# Patient Record
Sex: FEMALE | Race: WHITE | NOT HISPANIC OR LATINO | Employment: FULL TIME | URBAN - METROPOLITAN AREA
[De-identification: names, ages, dates, MRNs, and addresses within clinical notes are randomized per-mention and may not be internally consistent; named-entity substitution may affect disease eponyms.]

---

## 2020-06-30 DIAGNOSIS — R03.0 ELEVATED BLOOD PRESSURE READING WITHOUT DIAGNOSIS OF HYPERTENSION: Primary | ICD-10-CM

## 2020-07-06 ENCOUNTER — HOSPITAL ENCOUNTER (OUTPATIENT)
Dept: CARDIOLOGY | Facility: CLINIC | Age: 30
Discharge: HOME | End: 2020-07-06
Attending: FAMILY MEDICINE | Admitting: FAMILY MEDICINE
Payer: COMMERCIAL

## 2020-07-06 DIAGNOSIS — R03.0 ELEVATED BLOOD PRESSURE READING WITHOUT DIAGNOSIS OF HYPERTENSION: ICD-10-CM

## 2020-07-06 PROCEDURE — 93790 AMBL BP MNTR W/SW I&R: CPT | Performed by: INTERNAL MEDICINE

## 2020-07-06 PROCEDURE — 93788 AMBL BP MNTR W/SW A/R: CPT

## 2020-11-07 ENCOUNTER — HEALTH MAINTENANCE LETTER (OUTPATIENT)
Age: 30
End: 2020-11-07

## 2021-09-05 ENCOUNTER — HEALTH MAINTENANCE LETTER (OUTPATIENT)
Age: 31
End: 2021-09-05

## 2021-12-26 ENCOUNTER — HEALTH MAINTENANCE LETTER (OUTPATIENT)
Age: 31
End: 2021-12-26

## 2022-06-12 ENCOUNTER — HEALTH MAINTENANCE LETTER (OUTPATIENT)
Age: 32
End: 2022-06-12

## 2022-10-11 PROCEDURE — 88305 TISSUE EXAM BY PATHOLOGIST: CPT | Mod: TC,ORL | Performed by: STUDENT IN AN ORGANIZED HEALTH CARE EDUCATION/TRAINING PROGRAM

## 2022-10-11 PROCEDURE — 88305 TISSUE EXAM BY PATHOLOGIST: CPT | Mod: 26 | Performed by: STUDENT IN AN ORGANIZED HEALTH CARE EDUCATION/TRAINING PROGRAM

## 2022-10-12 ENCOUNTER — LAB REQUISITION (OUTPATIENT)
Dept: LAB | Facility: CLINIC | Age: 32
End: 2022-10-12
Payer: COMMERCIAL

## 2022-10-13 LAB
PATH REPORT.COMMENTS IMP SPEC: NORMAL
PATH REPORT.COMMENTS IMP SPEC: NORMAL
PATH REPORT.FINAL DX SPEC: NORMAL
PATH REPORT.GROSS SPEC: NORMAL
PATH REPORT.MICROSCOPIC SPEC OTHER STN: NORMAL
PATH REPORT.RELEVANT HX SPEC: NORMAL
PHOTO IMAGE: NORMAL

## 2022-10-23 ENCOUNTER — HEALTH MAINTENANCE LETTER (OUTPATIENT)
Age: 32
End: 2022-10-23

## 2023-04-20 ENCOUNTER — LAB REQUISITION (OUTPATIENT)
Dept: LAB | Facility: CLINIC | Age: 33
End: 2023-04-20
Payer: COMMERCIAL

## 2023-04-20 PROCEDURE — 88305 TISSUE EXAM BY PATHOLOGIST: CPT | Mod: 26 | Performed by: PATHOLOGY

## 2023-04-20 PROCEDURE — 88305 TISSUE EXAM BY PATHOLOGIST: CPT | Mod: TC,ORL | Performed by: OBSTETRICS & GYNECOLOGY

## 2023-06-24 ENCOUNTER — HEALTH MAINTENANCE LETTER (OUTPATIENT)
Age: 33
End: 2023-06-24

## 2023-10-13 ENCOUNTER — ANCILLARY PROCEDURE (OUTPATIENT)
Dept: GENERAL RADIOLOGY | Facility: CLINIC | Age: 33
End: 2023-10-13
Attending: STUDENT IN AN ORGANIZED HEALTH CARE EDUCATION/TRAINING PROGRAM
Payer: COMMERCIAL

## 2023-10-13 DIAGNOSIS — Z31.41 FERTILITY TESTING: ICD-10-CM

## 2023-10-13 PROCEDURE — 58340 CATHETER FOR HYSTEROGRAPHY: CPT

## 2023-10-13 PROCEDURE — 74740 X-RAY FEMALE GENITAL TRACT: CPT

## 2023-10-20 ENCOUNTER — TRANSFERRED RECORDS (OUTPATIENT)
Dept: HEALTH INFORMATION MANAGEMENT | Facility: CLINIC | Age: 33
End: 2023-10-20

## 2024-06-14 LAB
HEPATITIS B SURFACE ANTIGEN (EXTERNAL): NEGATIVE
HIV1+2 AB SERPL QL IA: NONREACTIVE
RUBELLA ANTIBODY IGG (EXTERNAL): NORMAL
TREPONEMA PALLIDUM ANTIBODY (EXTERNAL): NONREACTIVE

## 2024-07-10 ENCOUNTER — TRANSFERRED RECORDS (OUTPATIENT)
Dept: HEALTH INFORMATION MANAGEMENT | Facility: CLINIC | Age: 34
End: 2024-07-10
Payer: COMMERCIAL

## 2024-07-10 ENCOUNTER — MEDICAL CORRESPONDENCE (OUTPATIENT)
Dept: HEALTH INFORMATION MANAGEMENT | Facility: CLINIC | Age: 34
End: 2024-07-10
Payer: COMMERCIAL

## 2024-07-22 ENCOUNTER — TRANSCRIBE ORDERS (OUTPATIENT)
Dept: MATERNAL FETAL MEDICINE | Facility: CLINIC | Age: 34
End: 2024-07-22
Payer: COMMERCIAL

## 2024-07-22 DIAGNOSIS — O26.90 PREGNANCY RELATED CONDITION, ANTEPARTUM: Primary | ICD-10-CM

## 2024-08-17 ENCOUNTER — HEALTH MAINTENANCE LETTER (OUTPATIENT)
Age: 34
End: 2024-08-17

## 2024-08-18 ENCOUNTER — APPOINTMENT (OUTPATIENT)
Dept: ULTRASOUND IMAGING | Facility: CLINIC | Age: 34
End: 2024-08-18
Attending: OBSTETRICS & GYNECOLOGY
Payer: COMMERCIAL

## 2024-08-18 ENCOUNTER — HOSPITAL ENCOUNTER (OUTPATIENT)
Facility: CLINIC | Age: 34
Discharge: HOME | End: 2024-08-18
Attending: OBSTETRICS & GYNECOLOGY | Admitting: OBSTETRICS & GYNECOLOGY
Payer: COMMERCIAL

## 2024-08-18 ENCOUNTER — HOSPITAL ENCOUNTER (OUTPATIENT)
Facility: CLINIC | Age: 34
End: 2024-08-18
Admitting: OBSTETRICS & GYNECOLOGY
Payer: COMMERCIAL

## 2024-08-18 VITALS — TEMPERATURE: 98.1 F | DIASTOLIC BLOOD PRESSURE: 80 MMHG | SYSTOLIC BLOOD PRESSURE: 127 MMHG

## 2024-08-18 LAB
ALBUMIN UR-MCNC: NEGATIVE MG/DL
APPEARANCE UR: CLEAR
BILIRUB UR QL STRIP: NEGATIVE
CLUE CELLS: ABNORMAL
COLOR UR AUTO: ABNORMAL
CRYSTALS AMN MICRO: NORMAL
GLUCOSE UR STRIP-MCNC: NEGATIVE MG/DL
HGB UR QL STRIP: NEGATIVE
KETONES UR STRIP-MCNC: 150 MG/DL
LEUKOCYTE ESTERASE UR QL STRIP: NEGATIVE
MUCOUS THREADS #/AREA URNS LPF: PRESENT /LPF
NITRATE UR QL: NEGATIVE
PH UR STRIP: 5.5 [PH] (ref 5–7)
RBC URINE: 6 /HPF
SP GR UR STRIP: 1.02 (ref 1–1.03)
SQUAMOUS EPITHELIAL: 1 /HPF
TRICHOMONAS, WET PREP: ABNORMAL
UROBILINOGEN UR STRIP-MCNC: NORMAL MG/DL
WBC URINE: 5 /HPF
WBC'S/HIGH POWER FIELD, WET PREP: ABNORMAL
YEAST, WET PREP: PRESENT

## 2024-08-18 PROCEDURE — G0463 HOSPITAL OUTPT CLINIC VISIT: HCPCS | Mod: 25

## 2024-08-18 PROCEDURE — 81001 URINALYSIS AUTO W/SCOPE: CPT | Performed by: OBSTETRICS & GYNECOLOGY

## 2024-08-18 PROCEDURE — 87210 SMEAR WET MOUNT SALINE/INK: CPT | Performed by: OBSTETRICS & GYNECOLOGY

## 2024-08-18 PROCEDURE — 76805 OB US >/= 14 WKS SNGL FETUS: CPT

## 2024-08-18 RX ORDER — ONDANSETRON 4 MG/1
4 TABLET, ORALLY DISINTEGRATING ORAL EVERY 6 HOURS PRN
Status: DISCONTINUED | OUTPATIENT
Start: 2024-08-18 | End: 2024-08-18 | Stop reason: HOSPADM

## 2024-08-18 RX ORDER — PROCHLORPERAZINE 25 MG
25 SUPPOSITORY, RECTAL RECTAL EVERY 12 HOURS PRN
Status: DISCONTINUED | OUTPATIENT
Start: 2024-08-18 | End: 2024-08-18 | Stop reason: HOSPADM

## 2024-08-18 RX ORDER — DEXTROAMPHETAMINE SACCHARATE, AMPHETAMINE ASPARTATE MONOHYDRATE, DEXTROAMPHETAMINE SULFATE AND AMPHETAMINE SULFATE 7.5; 7.5; 7.5; 7.5 MG/1; MG/1; MG/1; MG/1
30 CAPSULE, EXTENDED RELEASE ORAL DAILY
COMMUNITY
Start: 2023-10-12

## 2024-08-18 RX ORDER — ONDANSETRON 2 MG/ML
4 INJECTION INTRAMUSCULAR; INTRAVENOUS EVERY 6 HOURS PRN
Status: DISCONTINUED | OUTPATIENT
Start: 2024-08-18 | End: 2024-08-18 | Stop reason: HOSPADM

## 2024-08-18 RX ORDER — METOCLOPRAMIDE HYDROCHLORIDE 5 MG/ML
10 INJECTION INTRAMUSCULAR; INTRAVENOUS EVERY 6 HOURS PRN
Status: DISCONTINUED | OUTPATIENT
Start: 2024-08-18 | End: 2024-08-18 | Stop reason: HOSPADM

## 2024-08-18 RX ORDER — NIFEDIPINE 30 MG/1
1 TABLET, EXTENDED RELEASE ORAL
COMMUNITY
Start: 2024-07-22

## 2024-08-18 RX ORDER — LEVOTHYROXINE SODIUM 100 UG/1
1 TABLET ORAL
COMMUNITY
Start: 2024-07-22

## 2024-08-18 RX ORDER — METOCLOPRAMIDE 10 MG/1
10 TABLET ORAL EVERY 6 HOURS PRN
Status: DISCONTINUED | OUTPATIENT
Start: 2024-08-18 | End: 2024-08-18 | Stop reason: HOSPADM

## 2024-08-18 RX ORDER — PROCHLORPERAZINE MALEATE 10 MG
10 TABLET ORAL EVERY 6 HOURS PRN
Status: DISCONTINUED | OUTPATIENT
Start: 2024-08-18 | End: 2024-08-18 | Stop reason: HOSPADM

## 2024-08-18 ASSESSMENT — ACTIVITIES OF DAILY LIVING (ADL)
ADLS_ACUITY_SCORE: 18
ADLS_ACUITY_SCORE: 35

## 2024-08-18 NOTE — CARE PLAN
"Data: Patient presented to [facility]: 2024  1:54 PM.  Reason for maternal/fetal assessment is leaking vaginal fluid. Patient reports large gush of fluid that happened yesterday evening around . Feels as though there has been some continued trickling of fluid today. Patient reports \" the fluid spelled sweet, my  thought he smelled urine\". Pt unsure if fluid is normal discharge at this time so here to be evaluated. No bleeding. Has had intercourse in the last 24 hours. .  Patient is a .  Prenatal record reviewed. Pregnancy  has been complicated by hypothyroidism, Hypertension, ADHD.  Gestational Age 16w2d. VSS. Fetal movement has not yet been felt by patient at 16w2d. Patient denies uterine contractions, vaginal bleeding, abdominal pain, pelvic pressure, nausea, vomiting, headache, visual disturbances, epigastric or URQ pain, significant edema. Support person is present.   Action: Verbal consent for EFM. Triage assessment completed. Bill of rights reviewed.  Response: Patient verbalized agreement with plan. Will contact Dr Regina Schmidt with update and for further orders.   "

## 2024-08-18 NOTE — PROVIDER NOTIFICATION
08/18/24 1548   Provider Notification   Provider Name/Title Dr Yaya Schmidt   Method of Notification Electronic Page   Request Evaluate - Remote   Notification Reason Lab/Diagnostic Study  (urine resulted Ketones elevated at 150)     No change to plan of care. Encourage patient to eat something per MD.

## 2024-08-18 NOTE — CARE PLAN
Data: Patient assessed in the [facility] for leaking vaginal fluid.  Cervical exam not examined.  Membranes intact.  Contractions/uterine assessment not completed as patient is 16w2d.  Action:  Discharge instructions reviewed.  Patient instructed to report vaginal leaking of fluid or bleeding, abdominal pain, or any concerns related to the pregnancy to her nurse/physician.    Response: Orders to discharge home per Regina Schmidt.  Patient verbalized understanding of education and verbalized agreement with plan. Discharged to home at 1555.     Patient instructed regarding positive yeast infection that resulted with wet prep. Per MD, patient can take over the counter miconazole if she feels she needs to at this time, since she is not having symptoms, ok to not treat at this time. Patient can also reach out to her clinic to see if they have further recommendations or if the patient were to develops symptoms.

## 2024-08-18 NOTE — PROVIDER NOTIFICATION
"   [id number]   Provider Notification   Provider Name/Title Dr Yaya Schmidt   Method of Notification Phone   Request Evaluate - Remote     Patient is usually seen at [facility] in [place]. Dr Yaya Schmidt is Dr of the week.  Patient is , 16wk2d. Presents with large gush of fluid that happened yesterday evening around . Feels as though there has been some continued trickling of fluid today. Patient reports \" the fluid spelled sweet, my  thought he smelled urine\". Pt unsure if fluid is normal discharge at this time so here to be evaluated. No bleeding. Has had intercourse in the last 24 hours.  Per Dr Schmidt, orders received for UA, wet prep, fern and US to assess fluid level. Pending all the labs and imaging, if everything looks normal, ok to discharge patient home and have her call her clinic to be seen in the next few days. If labs abnormal or concerning, will reach out to Dr Mendez for further orders.  "

## 2024-08-18 NOTE — PROVIDER NOTIFICATION
08/18/24 1536   Provider Notification   Provider Name/Title Dr Yaya Schmidt   Method of Notification Phone     Dr Yaya Schmidt called the unit stating she has seen patients resulted labs. No ferning present, US shows normal fluid levels. Wet prep came back with yeast, per MD, can get over the counter miconazole if she would like to treat, but if not having symptoms at this time, no need to treat. Patient can also reach out to her clinic and get recommendations from them. Will discuss with patient and plan for discharge to home.

## 2024-08-18 NOTE — DISCHARGE INSTRUCTIONS
Learning About When to Call Your Doctor During Pregnancy (Up to 20 Weeks)  Overview     It's common to have concerns about what might be a problem during your pregnancy. Most pregnancies don't have any serious problems. But it's still important to know when to call your doctor if you have certain symptoms.  These are general suggestions. Your doctor may give you some more information about when to call.  When to call your doctor (up to 20 weeks)  Call 911 anytime you think you may need emergency care. For example, call if:  You have severe vaginal bleeding. This means you are soaking through a pad each hour for 2 or more hours.  You have chest pain, are short of breath, or cough up blood.  You have sudden, severe pain in your belly.  You passed out (lost consciousness).  Call your doctor now or seek immediate medical care if:  You have a fever.  You have vaginal bleeding.  You are dizzy or lightheaded, or you feel like you may faint.  You have signs of a blood clot in your leg (called a deep vein thrombosis), such as:  Pain in the calf, back of the knee, thigh, or groin.  Swelling in your leg or groin.  A color change on the leg or groin. The skin may be reddish or purplish, depending on your usual skin color.  You have symptoms of a urinary tract infection. These may include:  Pain or burning when you urinate.  A frequent need to urinate without being able to pass much urine.  Pain in the flank, which is just below the rib cage and above the waist on either side of the back.  Blood in your urine.  You have belly pain.  You think you are having contractions.  Watch closely for changes in your health, and be sure to contact your doctor if:  You have vaginal discharge that smells bad.  You feel sad, anxious, or hopeless for more than a few days.  You have other concerns about your pregnancy.  Follow-up care is a key part of your treatment and safety. Be sure to make and go to all appointments, and call your doctor  "if you are having problems. It's also a good idea to know your test results and keep a list of the medicines you take.  Where can you learn more?  Go to [email or web address]  Enter G674 in the search box to learn more about \"Learning About When to Call Your Doctor During Pregnancy (Up to 20 Weeks).\"  Current as of: July 10, 2023  Content Version: 14.1 2006-2024 Calvin.   Care instructions adapted under license by your healthcare professional. If you have questions about a medical condition or this instruction, always ask your healthcare professional. [employer], [employer] disclaims any warranty or liability for your use of this information.    "

## 2024-08-26 ENCOUNTER — PRE VISIT (OUTPATIENT)
Dept: MATERNAL FETAL MEDICINE | Facility: CLINIC | Age: 34
End: 2024-08-26
Payer: COMMERCIAL

## 2024-09-03 ENCOUNTER — OFFICE VISIT (OUTPATIENT)
Dept: MATERNAL FETAL MEDICINE | Facility: CLINIC | Age: 34
End: 2024-09-03
Attending: OBSTETRICS & GYNECOLOGY
Payer: COMMERCIAL

## 2024-09-03 ENCOUNTER — HOSPITAL ENCOUNTER (OUTPATIENT)
Dept: ULTRASOUND IMAGING | Facility: CLINIC | Age: 34
Discharge: HOME | End: 2024-09-03
Attending: OBSTETRICS & GYNECOLOGY
Payer: COMMERCIAL

## 2024-09-03 DIAGNOSIS — O09.522 MULTIGRAVIDA OF ADVANCED MATERNAL AGE IN SECOND TRIMESTER: Primary | ICD-10-CM

## 2024-09-03 DIAGNOSIS — O26.90 PREGNANCY RELATED CONDITION, ANTEPARTUM: ICD-10-CM

## 2024-09-03 DIAGNOSIS — O10.919 CHRONIC HYPERTENSION IN PREGNANCY: Primary | ICD-10-CM

## 2024-09-03 DIAGNOSIS — O09.512 PRIMIGRAVIDA OF ADVANCED MATERNAL AGE IN SECOND TRIMESTER: ICD-10-CM

## 2024-09-03 DIAGNOSIS — Z36.2 ENCOUNTER FOR FOLLOW-UP ULTRASOUND OF FETAL ANATOMY: ICD-10-CM

## 2024-09-03 PROCEDURE — 76811 OB US DETAILED SNGL FETUS: CPT | Mod: 26 | Performed by: OBSTETRICS & GYNECOLOGY

## 2024-09-03 PROCEDURE — 99204 OFFICE O/P NEW MOD 45 MIN: CPT | Mod: 25 | Performed by: OBSTETRICS & GYNECOLOGY

## 2024-09-03 PROCEDURE — 96040 HC GENETIC COUNSELING, EACH 30 MINUTES: CPT

## 2024-09-03 PROCEDURE — 76811 OB US DETAILED SNGL FETUS: CPT

## 2024-09-03 NOTE — PROGRESS NOTES
[facility] Fetal Medicine Center  Genetic Counseling Consult    Patient:  Vianey Schmidt YOB: 1990   Date of Service:  24   MRN: [id number]    Vianey was seen at the [facility] for genetic consultation. The indication for genetic counseling is advanced maternal age and recurrent pregnancy loss. The patient was accompanied to this visit by their partner, David.    IMPRESSION/ PLAN   1. Vianey had genetic screening earlier in this pregnancy. Their non-invasive prenatal test was screen negative or low risk for screened conditions     2. During today's [facility] visit, Vianey had a genetic counseling session only. Screening and diagnostic testing was discussed and declined.     3. Vianey had a level II comprehensive anatomy ultrasound today. Please see the ultrasound report for further details.    4. Further recommendation include a follow-up ultrasound with [facility]. The upcoming ultrasound has been scheduled for 2024.    PREGNANCY HISTORY   /Parity:       Vianey's pregnancy history is significant for:   SAB 3x - Vianey reported that they speculate the miscarriages were due to her thyroid levels and after being placed on thyroid medication they were able to conceive and maintain pregnancy past first trimester.     Recurrent miscarriage is defined as three or more clinically recognized consecutive or non-consecutive pregnancy losses occurring prior to fetal viability (<24 weeks). At least 10-15% of the recognized pregnancies end in miscarriage, with most losses occurring in the first trimester. The rate of miscarriage less than 8 weeks gestation may be even greater as some women may not recognize that they are pregnant. Around half of miscarriages that occur before 20 weeks gestation are caused by chromosome abnormalities. The risk for a miscarriage increases with advancing maternal age due to a higher incidence of conceptuses with a  "chromosomal aneuploidy. The risk may approach 75% in women who are 45 years of age and older. In about 50% of couples with recurrent pregnancy loss, the etiology remains unknown despite a thorough evaluation and is therefore classified as idiopathic. It is estimated that couples with idiopathic recurrent pregnacy loss can have up to a 75% chance of having a successful pregnancy. For couples who have experienced three or more unexplained pregnancy losses, it has been estimated that around 2-5% of these couples have this history related to a chromosome in one of the partners. Chromosome analysis on parents is possible by obtaining a karyotype on peripheral blood in which the chromosomes are sorted and counted in the laboratory. Vianey and David declined parental chromosomes at this time and they are aware that this option remains available to them.       CURRENT PREGNANCY   Current Age: 34 year old     Age at Delivery: 34 year old    ANSON: 2025, by Last Menstrual Period                                     Gestational Age: 18w4d    This pregnancy is a single gestation.     This pregnancy was conceived spontaneously.    Vianey reports no bleeding, complications, illnesses, fever or exposure concerns with this pregnancy.     MEDICAL HISTORY   Vianey s reported medical history is not expected to impact pregnancy management or risks to fetal development.        FAMILY HISTORY   A three-generation pedigree was obtained today and is scanned under the \"Media\" tab in Epic. The family history was reported by Vianey and their partner.    The following significant findings were reported today:   Vianey's maternal grandfather passed away at age 45 from a heart attack.   Vianey's paternal uncle passed away from a heart attack in his 50s  Vianey's paternal aunt  from bone cancer in her 50s  The father of the pregnancy, David (29yrs), is healthy   David's brother was born with a minor cleft eye but is otherwise healthy.    We discussed " that approximately 3-5% of births are affected by a birth defect and having a relative with a birth defect may increase that risk.   David's maternal grandfather was diagnosed with multiple sclerosis in his 30s. Multiple sclerosis (MS) is an autoimmune disease of the central nervous system, characterized by focal inflammation, demyelination, and axonal injury. MS is thought to be multifactorial, meaning a combination of environmental factors and variations in dozens of genes are involved in the development of MS. Given that there is a genetic component, the risk of developing MS is higher for siblings or children of a person with the condition than for the general population.  David's maternal cousin, has developmental and intellectual delays.   David's paternal grandma had ovarian cancer in her 60s.     We discussed how most cancer seen in families occurs sporadically, but about 5-10% may be due to an underlying genetic etiology. The couple was encouraged to share this family history information with their primary care providers to ensure appropriate screening. Additionally, I encouraged Vianey to share her family history of heart disease with her primary care provider.   Otherwise, the reported family history is unremarkable for multiple miscarriages, stillbirths, known genetic conditions, and consanguinity.   RISK ASSESSMENT FOR CHROMOSOME CONDITIONS   We explained that the risk for fetal chromosome abnormalities increases with maternal age. We discussed specific features of common chromosome abnormalities, including Down syndrome, trisomy 13, trisomy 18, and sex chromosome trisomies.    At age 34 at midtrimester, the risk to have a baby with Down syndrome is 1 in 216.   At age 34 at midtrimester, the risk to have a baby with any chromosome abnormality is 1 in 105.     Vianey had genetic screening earlier in this pregnancy. Their non-invasive prenatal test was screen negative or low risk for screened conditions      Non-invasive prenatal testing (NIPT) results  Maternal plasma cell-free DNA testing  Screens for fetal trisomy 21, trisomy 13, trisomy 18, and sex chromosome aneuploidy  Vianey had  NIPT earlier in pregnancy; we reviewed the results today, which are low risk.  The NIPT did include sex chromosome aneuploidies and the result was low risk. The predicted sex is XX, which is typically female.  Given the accuracy of this test, these results greatly decrease the chance for certain fetal chromosome abnormalities  We discussed the limitations of normal NIPT results    RISK ASSESSMENT FOR INHERITED CONDITIONS   We discussed that every pregnancy has a chance to have an inherited single-gene condition, even when there is no family history of that condition. In fact, approximately 90% of couples at an increased reproductive risk for an inherited condition have no family history of that condition. The average person may be a carrier for 5-10 different genetic variants that can increase the chance for their pregnancies to have that condition. We discussed autosomal recessive conditions and X-linked conditions. Autosomal recessive conditions happen when a mutation has been inherited from the egg and sperm and include conditions like cystic fibrosis, thalassemia, hearing loss, spinal muscular atrophy, and more. X-linked conditions happen when a mutation has been inherited from the egg and include conditions like fragile X syndrome.     We reviewed that when both biological parents carry a harmful genetic change in a gene associated with autosomal recessive inheritance, each of their pregnancies has a 1 in 4 (25%) chance to be affected by that condition. With x-linked conditions, the specific risk generally depends on the chromosomal sex of the fetus, with XY individuals (generally male) being most severely affected.      screening was reviewed. About [place]    The patient had previous carrier screening for  436 conditions through [employer]. A copy of the report was available for review today. The patient's partner did not complete carrier screening as well.  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     Douglas disease (ATP7B: c.2930C>T):  This condition causes copped to build up in the organs, particularly the liver, brain, and eyes. The age of onset and severity can vary, though typically presents in the teenage years or earlier.   Symptoms include liver dysfunction, jaundice, and liver failure. Copper deposits in the eye are also common and form a brownish-yellow ring around the iris called a Kayser-Fleischer ring. Other symptoms include kidney and heart disease, weakening of the bones, difficulty walking and speaking, and hemolytic anemia. Psychiatric concerns can also be present.   Douglas disease can be treated and includes chelation therapy to remove copper from the body. [place]  scre[place].   David has not had carrier screening for Douglas disease. The carrier frequency for Douglas disease is .     Primary Ciliary Dyskinesia (DNAI1: c.389-1G>C):  This condition is an example of a ciliopathy condition, which impacts the structure or function of the cilia of a cell. Cilia are involved with cell movement and signaling.   Symptoms of PCD can impact many different body systems, including breathing problems at birth, chronic ear infections, internal organs not typically arranged in the expected way (heterotaxy), or even situs inversus totalis, which is a complete mirror-image reversal of the internal organs.   Infertility is common amongst individuals with PCD due to motility differences in the sperm or structural differences in the fallopian tubes.  David has not had carrier screening for Primary Ciliary Dyskinesia The carrier frequency for PCD is 1/230.     If David was a carrier of either of these conditions the chance of having an affected  child is 1 in 4. Carrier screening for these conditions was offered to David and declined.  All of Vianey's children will have a 50% chance of being an unaffected carrier. If     GENETIC TESTING OPTIONS   Genetic testing during a pregnancy includes screening and diagnostic procedures.      Screening tests are non-invasive which means no risk to the pregnancy and includes ultrasounds and blood work. The benefits and limitations of screening were reviewed. Screening tests provide a risk assessment (chance) specific to the pregnancy for certain fetal chromosome abnormalities but cannot definitively diagnose or exclude a fetal chromosome abnormality. Follow-up genetic counseling and consideration of diagnostic testing is recommended with any abnormal screening result. Diagnostic testing during a pregnancy is more certain and can test for more conditions. However, the tests do have a risk of miscarriage that requires careful consideration. These tests can detect fetal chromosome abnormalities with greater than 99% certainty. Results can be compromised by maternal cell contamination or mosaicism and are limited by the resolution of current genetic testing technology.     There is no screening or diagnostic test that detects all forms of birth defects or intellectual disability.     We discussed the following screening options:   Carrier screening  Risk assessment for certain autosomal recessive and x-linked conditions. These conditions are generally infantile- or childhood-onset conditions.   Can be done any time during the pregnancy or prior to pregnancy.  Can screen for over 500 different genetic conditions.  Is not intended to diagnosis a condition in the carrier parent.    Even with negative results, a residual risk for screened conditions remains.      We discussed the following ultrasound options:  Comprehensive level II ultrasound (Fetal Anatomy Ultrasound)  Ultrasound done between 18-20 weeks gestation  Screens for  major birth defects and markers for aneuploidy (like trisomy 21 and trisomy 18)  Includes looking at the fetus/baby's growth, heart, organs (stomach, kidneys), placenta, and amniotic fluid    We discussed the following diagnostic options:   Amniocentesis  Invasive diagnostic procedure done after 15 weeks gestation  The procedure collects a small sample of amniotic fluid for the purpose of chromosomal testing and/or other genetic testing  Diagnostic result; more than 99% sensitivity for fetal chromosome abnormalities  Testing for AFP in the amniotic fluid can test for open neural tube defects      It was a pleasure to be involved with [facility] care. Face-to-face time of the meeting was  20  minutes.    TED DUKES MS, [facility]  Genetic Counselor  [facility]  Maternal Fetal Medicine  Office: [phone]   [facility]: [phone]   Fax: [phone]  [facility]

## 2024-09-03 NOTE — PROGRESS NOTES
The patient was seen for an ultrasound in the Maternal-Fetal Medicine Center at the [facility] today.  For a detailed report of the ultrasound examination, please see the ultrasound report which can be found under the imaging tab.    If you have questions regarding today's evaluation or if we can be of further service, please contact the Maternal-Fetal Medicine Center.    Shereen Marrero MD  , OB/GYN  Maternal-Fetal Medicine  [phone] (Pager)

## 2024-09-03 NOTE — NURSING NOTE
Patient presents to [facility] for L2 at 18w4d due to AMA, ADHD, hypothyroidism, CHTN. Denies LOF, vaginal bleeding, cramping/contractions. SBAR given to [facility] MD, see their note in Epic.

## 2024-09-10 ENCOUNTER — TRANSFERRED RECORDS (OUTPATIENT)
Dept: HEALTH INFORMATION MANAGEMENT | Facility: CLINIC | Age: 34
End: 2024-09-10
Payer: COMMERCIAL

## 2024-09-24 ENCOUNTER — HOSPITAL ENCOUNTER (OUTPATIENT)
Dept: ULTRASOUND IMAGING | Facility: CLINIC | Age: 34
Discharge: HOME | End: 2024-09-24
Attending: OBSTETRICS & GYNECOLOGY
Payer: COMMERCIAL

## 2024-09-24 ENCOUNTER — OFFICE VISIT (OUTPATIENT)
Dept: MATERNAL FETAL MEDICINE | Facility: CLINIC | Age: 34
End: 2024-09-24
Attending: OBSTETRICS & GYNECOLOGY
Payer: COMMERCIAL

## 2024-09-24 DIAGNOSIS — Z36.2 ENCOUNTER FOR FOLLOW-UP ULTRASOUND OF FETAL ANATOMY: ICD-10-CM

## 2024-09-24 DIAGNOSIS — O10.919 CHRONIC HYPERTENSION IN PREGNANCY: ICD-10-CM

## 2024-09-24 DIAGNOSIS — O09.512 PRIMIGRAVIDA OF ADVANCED MATERNAL AGE IN SECOND TRIMESTER: Primary | ICD-10-CM

## 2024-09-24 PROCEDURE — 76816 OB US FOLLOW-UP PER FETUS: CPT

## 2024-09-24 PROCEDURE — 76816 OB US FOLLOW-UP PER FETUS: CPT | Mod: 26 | Performed by: OBSTETRICS & GYNECOLOGY

## 2024-09-24 NOTE — PROGRESS NOTES
The patient was seen for an ultrasound in the Maternal-Fetal Medicine Center at the [facility] today.  For a detailed report of the ultrasound examination, please see the ultrasound report which can be found under the imaging tab.    If you have questions regarding today's evaluation or if we can be of further service, please contact the Maternal-Fetal Medicine Center.    Shereen Marrero MD  , OB/GYN  Maternal-Fetal Medicine  [phone] (Pager)

## 2024-09-24 NOTE — NURSING NOTE
Patient reports positive fetal movement, no pain, no contractions, leaking of fluid, or bleeding.   SBAR given to GONZÁLEZ ERY, see their note in Epic.

## 2024-12-06 ENCOUNTER — HOSPITAL ENCOUNTER (OUTPATIENT)
Facility: CLINIC | Age: 34
Discharge: HOME | End: 2024-12-06
Attending: STUDENT IN AN ORGANIZED HEALTH CARE EDUCATION/TRAINING PROGRAM | Admitting: STUDENT IN AN ORGANIZED HEALTH CARE EDUCATION/TRAINING PROGRAM
Payer: COMMERCIAL

## 2024-12-06 VITALS
HEIGHT: 60 IN | DIASTOLIC BLOOD PRESSURE: 79 MMHG | BODY MASS INDEX: 39.46 KG/M2 | RESPIRATION RATE: 14 BRPM | SYSTOLIC BLOOD PRESSURE: 124 MMHG | WEIGHT: 201 LBS | TEMPERATURE: 98.1 F

## 2024-12-06 LAB
ALBUMIN MFR UR ELPH: 8.5 MG/DL
ALBUMIN SERPL BCG-MCNC: 3.5 G/DL (ref 3.5–5.2)
ALP SERPL-CCNC: 133 U/L (ref 40–150)
ALT SERPL W P-5'-P-CCNC: 14 U/L (ref 0–50)
ANION GAP SERPL CALCULATED.3IONS-SCNC: 12 MMOL/L (ref 7–15)
AST SERPL W P-5'-P-CCNC: 23 U/L (ref 0–45)
BILIRUB SERPL-MCNC: 0.2 MG/DL
BUN SERPL-MCNC: 7.3 MG/DL (ref 6–20)
CALCIUM SERPL-MCNC: 8.8 MG/DL (ref 8.8–10.4)
CHLORIDE SERPL-SCNC: 103 MMOL/L (ref 98–107)
CREAT SERPL-MCNC: 0.35 MG/DL (ref 0.51–0.95)
CREAT UR-MCNC: 47.1 MG/DL
EGFRCR SERPLBLD CKD-EPI 2021: >90 ML/MIN/1.73M2
ERYTHROCYTE [DISTWIDTH] IN BLOOD BY AUTOMATED COUNT: 13.8 % (ref 10–15)
GLUCOSE SERPL-MCNC: 86 MG/DL (ref 70–99)
HCO3 SERPL-SCNC: 21 MMOL/L (ref 22–29)
HCT VFR BLD AUTO: 34.8 % (ref 35–47)
HGB BLD-MCNC: 11.6 G/DL (ref 11.7–15.7)
MCH RBC QN AUTO: 29 PG (ref 26.5–33)
MCHC RBC AUTO-ENTMCNC: 33.3 G/DL (ref 31.5–36.5)
MCV RBC AUTO: 87 FL (ref 78–100)
PLATELET # BLD AUTO: 283 10E3/UL (ref 150–450)
POTASSIUM SERPL-SCNC: 3.4 MMOL/L (ref 3.4–5.3)
PROT SERPL-MCNC: 6.2 G/DL (ref 6.4–8.3)
PROT/CREAT 24H UR: 0.18 MG/MG CR (ref 0–0.2)
RBC # BLD AUTO: 4 10E6/UL (ref 3.8–5.2)
SODIUM SERPL-SCNC: 136 MMOL/L (ref 135–145)
WBC # BLD AUTO: 12.8 10E3/UL (ref 4–11)

## 2024-12-06 PROCEDURE — 82247 BILIRUBIN TOTAL: CPT | Performed by: STUDENT IN AN ORGANIZED HEALTH CARE EDUCATION/TRAINING PROGRAM

## 2024-12-06 PROCEDURE — 82040 ASSAY OF SERUM ALBUMIN: CPT | Performed by: STUDENT IN AN ORGANIZED HEALTH CARE EDUCATION/TRAINING PROGRAM

## 2024-12-06 PROCEDURE — 84156 ASSAY OF PROTEIN URINE: CPT | Performed by: STUDENT IN AN ORGANIZED HEALTH CARE EDUCATION/TRAINING PROGRAM

## 2024-12-06 PROCEDURE — G0463 HOSPITAL OUTPT CLINIC VISIT: HCPCS | Mod: 25

## 2024-12-06 PROCEDURE — 84155 ASSAY OF PROTEIN SERUM: CPT | Performed by: STUDENT IN AN ORGANIZED HEALTH CARE EDUCATION/TRAINING PROGRAM

## 2024-12-06 PROCEDURE — 59025 FETAL NON-STRESS TEST: CPT

## 2024-12-06 PROCEDURE — 85027 COMPLETE CBC AUTOMATED: CPT | Performed by: STUDENT IN AN ORGANIZED HEALTH CARE EDUCATION/TRAINING PROGRAM

## 2024-12-06 PROCEDURE — 36415 COLL VENOUS BLD VENIPUNCTURE: CPT | Performed by: STUDENT IN AN ORGANIZED HEALTH CARE EDUCATION/TRAINING PROGRAM

## 2024-12-06 RX ORDER — VITAMIN A, VITAMIN C, VITAMIN D-3, VITAMIN E, VITAMIN B-1, VITAMIN B-2, NIACIN, VITAMIN B-6, CALCIUM, IRON, ZINC, COPPER 4000; 120; 400; 22; 1.84; 3; 20; 10; 1; 12; 200; 27; 25; 2 [IU]/1; MG/1; [IU]/1; MG/1; MG/1; MG/1; MG/1; MG/1; MG/1; UG/1; MG/1; MG/1; MG/1; MG/1
TABLET ORAL DAILY
COMMUNITY

## 2024-12-06 RX ORDER — ASPIRIN 81 MG/1
81 TABLET, CHEWABLE ORAL DAILY
COMMUNITY

## 2024-12-06 ASSESSMENT — ACTIVITIES OF DAILY LIVING (ADL)
ADLS_ACUITY_SCORE: 18
ADLS_ACUITY_SCORE: 18

## 2024-12-06 NOTE — PROGRESS NOTES
Pt arrived from clinic with mother for further fetal assessment, extended monitoring, and pre eclampsia lab assessment. With consent, fetal monitors placed on maternal abdomen. Initial BP slightly elevated, see flowsheet, subsequent readings WNL. Pt with trace generalized and BLE edema, denies other s/sx of preeclampsia. FHTs category 1. No uterine contractions noted on toco or noted by pt. Dr Tellez updated with lab results, fetal monitoring. Pt denies loss of fluid or vaginal bleeding. Plan to discharge pt to home and follow up as scheduled in clinic. Pt verbalizes understanding of plan of care, no further questions or concerns. Discharge to home.

## 2024-12-06 NOTE — DISCHARGE INSTRUCTIONS
Learning About When to Call Your Doctor During Pregnancy (After 20 Weeks)  Overview  It's common to have concerns about what might be a problem when you're pregnant. Most pregnancies don't have any serious problems. But it's still important to know when to call your doctor if you have certain symptoms or signs of labor.  These are general suggestions. Your doctor may give you some more information about when to call.  When to call your doctor (after 20 weeks)  Call 911  anytime you think you may need emergency care. For example, call if:  You have severe vaginal bleeding. This means you are soaking through a pad each hour for 2 or more hours.  You have sudden, severe pain in your belly.  You have chest pain, are short of breath, or cough up blood.  You passed out (lost consciousness).  You have a seizure.  You see or feel the umbilical cord.  You think you are about to deliver your baby and can't make it safely to the hospital or birthing center.  Call your doctor now or seek immediate medical care if:  You have vaginal bleeding.  You have belly pain.  You have a fever.  You are dizzy or lightheaded, or you feel like you may faint.  You have signs of a blood clot in your leg (called a deep vein thrombosis), such as:  Pain in the calf, back of the knee, thigh, or groin.  Swelling in your leg or groin.  A color change on the leg or groin. The skin may be reddish or purplish, depending on your usual skin color.  You have symptoms of preeclampsia, such as:  Sudden swelling of your face, hands, or feet.  New vision problems (such as dimness, blurring, or seeing spots).  A severe headache.  You have a sudden release of fluid from your vagina. (You think your water broke.)  You've been having regular contractions for an hour. This means that you've had at least 6 contractions within 1 hour, even after you change your position and drink fluids.  You notice that your baby has stopped moving or is moving less than  "normal.  You have signs of heart failure, such as:  New or increased shortness of breath.  New or worse swelling in your legs, ankles, or feet.  Sudden weight gain, such as more than 2 to 3 pounds in a day or 5 pounds in a week.  Feeling so tired or weak that you cannot do your usual activities.  You have symptoms of a urinary tract infection. These may include:  Pain or burning when you urinate.  A frequent need to urinate without being able to pass much urine.  Pain in the flank, which is just below the rib cage and above the waist on either side of the back.  Blood in your urine.  Watch closely for changes in your health, and be sure to contact your doctor if:  You have vaginal discharge that smells bad.  You feel sad, anxious, or hopeless for more than a few days.  You have skin changes, such as a rash, itching, or a yellow color to your skin.  You have other concerns about your pregnancy.  If you have labor signs at 37 weeks or more  If you have signs of labor at 37 weeks or more, your doctor may tell you to call when your labor becomes more active. Symptoms of active labor include:  Contractions that are regular.  Contractions that are less than 5 minutes apart.  Contractions that are hard to talk through.  Follow-up care is a key part of your treatment and safety. Be sure to make and go to all appointments, and call your doctor if you are having problems. It's also a good idea to know your test results and keep a list of the medicines you take.  Where can you learn more?  Go to https://www.[employer].net/patiented  Enter N531 in the search box to learn more about \"Learning About When to Call Your Doctor During Pregnancy (After 20 Weeks).\"  Current as of: July 10, 2023  Content Version: 14.2 2024 [facility].   Care instructions adapted under license by your healthcare professional. If you have questions about a medical condition or this instruction, always ask your healthcare professional. " [employer] disclaims any warranty or liability for your use of this information.

## 2024-12-10 ENCOUNTER — HOSPITAL ENCOUNTER (OUTPATIENT)
Facility: CLINIC | Age: 34
Setting detail: OBSERVATION
Discharge: HOME | End: 2024-12-12
Attending: STUDENT IN AN ORGANIZED HEALTH CARE EDUCATION/TRAINING PROGRAM | Admitting: STUDENT IN AN ORGANIZED HEALTH CARE EDUCATION/TRAINING PROGRAM
Payer: COMMERCIAL

## 2024-12-10 DIAGNOSIS — I10 CHRONIC HYPERTENSION: Primary | ICD-10-CM

## 2024-12-10 PROBLEM — Z36.89 ENCOUNTER FOR TRIAGE IN PREGNANT PATIENT: Status: ACTIVE | Noted: 2024-12-10

## 2024-12-10 PROBLEM — O09.90 SUPERVISION OF HIGH-RISK PREGNANCY: Status: ACTIVE | Noted: 2024-12-10

## 2024-12-10 LAB
ALBUMIN MFR UR ELPH: 25 MG/DL
ALBUMIN SERPL BCG-MCNC: 3.4 G/DL (ref 3.5–5.2)
ALP SERPL-CCNC: 126 U/L (ref 40–150)
ALT SERPL W P-5'-P-CCNC: 13 U/L (ref 0–50)
ANION GAP SERPL CALCULATED.3IONS-SCNC: 12 MMOL/L (ref 7–15)
AST SERPL W P-5'-P-CCNC: 15 U/L (ref 0–45)
BILIRUB SERPL-MCNC: 0.2 MG/DL
BUN SERPL-MCNC: 10.9 MG/DL (ref 6–20)
CALCIUM SERPL-MCNC: 8.8 MG/DL (ref 8.8–10.4)
CHLORIDE SERPL-SCNC: 100 MMOL/L (ref 98–107)
CREAT SERPL-MCNC: 0.41 MG/DL (ref 0.51–0.95)
CREAT UR-MCNC: 149.5 MG/DL
EGFRCR SERPLBLD CKD-EPI 2021: >90 ML/MIN/1.73M2
ERYTHROCYTE [DISTWIDTH] IN BLOOD BY AUTOMATED COUNT: 13.9 % (ref 10–15)
GLUCOSE SERPL-MCNC: 93 MG/DL (ref 70–99)
HCO3 SERPL-SCNC: 23 MMOL/L (ref 22–29)
HCT VFR BLD AUTO: 33.3 % (ref 35–47)
HGB BLD-MCNC: 11.1 G/DL (ref 11.7–15.7)
MCH RBC QN AUTO: 28.7 PG (ref 26.5–33)
MCHC RBC AUTO-ENTMCNC: 33.3 G/DL (ref 31.5–36.5)
MCV RBC AUTO: 86 FL (ref 78–100)
PLATELET # BLD AUTO: 271 10E3/UL (ref 150–450)
POTASSIUM SERPL-SCNC: 3 MMOL/L (ref 3.4–5.3)
PROT SERPL-MCNC: 5.9 G/DL (ref 6.4–8.3)
PROT/CREAT 24H UR: 0.17 MG/MG CR (ref 0–0.2)
RBC # BLD AUTO: 3.87 10E6/UL (ref 3.8–5.2)
SODIUM SERPL-SCNC: 135 MMOL/L (ref 135–145)
WBC # BLD AUTO: 12.6 10E3/UL (ref 4–11)

## 2024-12-10 PROCEDURE — 36415 COLL VENOUS BLD VENIPUNCTURE: CPT | Performed by: STUDENT IN AN ORGANIZED HEALTH CARE EDUCATION/TRAINING PROGRAM

## 2024-12-10 PROCEDURE — 250N000011 HC RX IP 250 OP 636: Performed by: STUDENT IN AN ORGANIZED HEALTH CARE EDUCATION/TRAINING PROGRAM

## 2024-12-10 PROCEDURE — 96372 THER/PROPH/DIAG INJ SC/IM: CPT | Performed by: STUDENT IN AN ORGANIZED HEALTH CARE EDUCATION/TRAINING PROGRAM

## 2024-12-10 PROCEDURE — 80051 ELECTROLYTE PANEL: CPT | Performed by: STUDENT IN AN ORGANIZED HEALTH CARE EDUCATION/TRAINING PROGRAM

## 2024-12-10 PROCEDURE — 84156 ASSAY OF PROTEIN URINE: CPT | Performed by: STUDENT IN AN ORGANIZED HEALTH CARE EDUCATION/TRAINING PROGRAM

## 2024-12-10 PROCEDURE — G0463 HOSPITAL OUTPT CLINIC VISIT: HCPCS

## 2024-12-10 PROCEDURE — 250N000013 HC RX MED GY IP 250 OP 250 PS 637: Performed by: OBSTETRICS & GYNECOLOGY

## 2024-12-10 PROCEDURE — 87653 STREP B DNA AMP PROBE: CPT | Performed by: STUDENT IN AN ORGANIZED HEALTH CARE EDUCATION/TRAINING PROGRAM

## 2024-12-10 PROCEDURE — 85027 COMPLETE CBC AUTOMATED: CPT | Performed by: STUDENT IN AN ORGANIZED HEALTH CARE EDUCATION/TRAINING PROGRAM

## 2024-12-10 PROCEDURE — G0378 HOSPITAL OBSERVATION PER HR: HCPCS

## 2024-12-10 RX ORDER — LIDOCAINE 40 MG/G
CREAM TOPICAL
Status: DISCONTINUED | OUTPATIENT
Start: 2024-12-10 | End: 2024-12-12 | Stop reason: HOSPADM

## 2024-12-10 RX ORDER — BETAMETHASONE SODIUM PHOSPHATE AND BETAMETHASONE ACETATE 3; 3 MG/ML; MG/ML
12 INJECTION, SUSPENSION INTRA-ARTICULAR; INTRALESIONAL; INTRAMUSCULAR; SOFT TISSUE DAILY
Status: COMPLETED | OUTPATIENT
Start: 2024-12-10 | End: 2024-12-11

## 2024-12-10 RX ORDER — CALCIUM POLYCARBOPHIL 625 MG
TABLET ORAL DAILY
COMMUNITY

## 2024-12-10 RX ORDER — PRENATAL VIT/IRON FUM/FOLIC AC 27MG-0.8MG
1 TABLET ORAL DAILY
Status: DISCONTINUED | OUTPATIENT
Start: 2024-12-11 | End: 2024-12-12 | Stop reason: HOSPADM

## 2024-12-10 RX ORDER — HYDRALAZINE HYDROCHLORIDE 20 MG/ML
10 INJECTION INTRAMUSCULAR; INTRAVENOUS
Status: DISCONTINUED | OUTPATIENT
Start: 2024-12-10 | End: 2024-12-12 | Stop reason: HOSPADM

## 2024-12-10 RX ORDER — LEVOTHYROXINE SODIUM 50 UG/1
100 TABLET ORAL
Status: DISCONTINUED | OUTPATIENT
Start: 2024-12-11 | End: 2024-12-12 | Stop reason: HOSPADM

## 2024-12-10 RX ORDER — PANTOPRAZOLE SODIUM 40 MG/1
40 TABLET, DELAYED RELEASE ORAL DAILY
Status: DISCONTINUED | OUTPATIENT
Start: 2024-12-11 | End: 2024-12-12 | Stop reason: HOSPADM

## 2024-12-10 RX ORDER — NIFEDIPINE 30 MG/1
60 TABLET, EXTENDED RELEASE ORAL
Status: DISCONTINUED | OUTPATIENT
Start: 2024-12-10 | End: 2024-12-12 | Stop reason: HOSPADM

## 2024-12-10 RX ORDER — ASPIRIN 81 MG/1
81 TABLET, CHEWABLE ORAL DAILY
Status: DISCONTINUED | OUTPATIENT
Start: 2024-12-11 | End: 2024-12-12 | Stop reason: HOSPADM

## 2024-12-10 RX ORDER — LABETALOL HYDROCHLORIDE 5 MG/ML
20-80 INJECTION, SOLUTION INTRAVENOUS EVERY 10 MIN PRN
Status: DISCONTINUED | OUTPATIENT
Start: 2024-12-10 | End: 2024-12-12 | Stop reason: HOSPADM

## 2024-12-10 RX ADMIN — NIFEDIPINE 60 MG: 30 TABLET, FILM COATED, EXTENDED RELEASE ORAL at 19:33

## 2024-12-10 RX ADMIN — BETAMETHASONE ACETATE AND BETAMETHASONE SODIUM PHOSPHATE 12 MG: 3; 3 INJECTION, SUSPENSION INTRA-ARTICULAR; INTRALESIONAL; INTRAMUSCULAR; SOFT TISSUE at 18:24

## 2024-12-10 RX ADMIN — DOXYLAMINE SUCCINATE 25 MG: 25 TABLET ORAL at 19:33

## 2024-12-10 ASSESSMENT — ACTIVITIES OF DAILY LIVING (ADL)
ADLS_ACUITY_SCORE: 41
ADLS_ACUITY_SCORE: 18

## 2024-12-10 NOTE — H&P
OB HISTORY AND PHYSICAL    Patient Name: Vianey Schmidt   Admit Date: 1210  MR #: [id number]   Acct #: [id number]   : 1990     Chief Complaint/Reason for Visit: Elevated blood pressures    History of Present Illness: Vianey Schmidt is a 34 year old  at 32w4d here for inpatient observation for blood pressure monitoring and optimization, rule out preeclampsia. Vianey has a known hx of CHTN, Bps have been stable on Procardia XL 30mg daily prior to conceiving and throughout pregnancy until 1-2 weeks ago. Last week her home Bps increased to 140-150s, increased procardia to 60mg daily. Pt called on Saturday with persistently elevated Bps in 150s, Procardia increased to 60mg qAM and 30mg qPM. Today presented to clinic with persistently elevated blood pressures 140-160s/90s. Increased swelling of both feet, ankles, and lower legs. Had intermittent headache last week that is now resolved. Occ feels lightheaded when BP is elevated. Denies vision changes, chest pain, shortness of breath or RUQ pain.  Pt notes her baby has never been super active but no major changes from her baseline. Denies ctx, vb, lof    Review of Systems:  General: denies fevers  CV: denies CP  Pulm: denies SOB  Neuro: denies HA  Abd: denies N/V  Gyn: denies vaginal discharge  Skin: denies rashes  MSK: denies calf pain  Psych: denies depression     History:   OB History    Para Term  AB Living   4 0 0 0 3 0   SAB IAB Ectopic Multiple Live Births   3 0 0 0 0      # Outcome Date GA Lbr Guilherme/2nd Weight Sex Type Anes PTL Lv   4 Current            3 SAB            2 SAB            1 SAB                  Past Medical History:   Diagnosis Date    ADHD (attention deficit hyperactivity disorder), combined type     Hypertension     Thyroid disease        Past Surgical History:   Procedure Laterality Date    BIOPSY/REMOVAL, LYMPH NODE(S)      11 yo, neck    DILATION AND CURETTAGE      x2       History reviewed. No pertinent family  history.    Social History     Socioeconomic History    Marital status:      Spouse name: Not on file    Number of children: Not on file    Years of education: Not on file    Highest education level: Not on file   Occupational History    Not on file   Tobacco Use    Smoking status: Never     Passive exposure: Never    Smokeless tobacco: Never   Substance and Sexual Activity    Alcohol use: Not Currently    Drug use: Never    Sexual activity: Yes     Partners: Male   Other Topics Concern    Not on file   Social History Narrative    Not on file     Social Drivers of Health     Financial Resource Strain: Not on file   Food Insecurity: Not on file   Transportation Needs: Not on file   Physical Activity: Not on file   Stress: Not on file   Social Connections: Not on file   Interpersonal Safety: Not on file   Housing Stability: Not on file       Allergy Information:        I have reviewed the patient's allergies.      ALLERGY@    Home Medications:   No current outpatient medications on file.       Physical Examination:  Vitals:  Temp:  [97.5  F (36.4  C)] 97.5  F (36.4  C)  BP: (119-139)/(73-89) 132/88    Exam:  General: no acute distress  Head: normocephalic, atraumatic  Eyes: EOMI  CV: pulses intact  Pulm: no increased effort  Neuro: CN II-XII grossly intact  Abd: gravid, soft, NTTP  Gyn: deferred  Skin: no rashes  MSK: no calf tenderness  Psych: AOx3, appropriate mood/affect    Fetus: FHT: 135, moderate variability, positive accels, no decels; [place]: quiet.    Laboratory and Additional Data Reviewed:  Any associated labs, path, imaging personally reviewed  No results found for any visits on 12/10/24.      Assessment and Plan: Vianey Schmidt is a 34 year old  at 32w4d here for inpatient observation for CHTN, rule out superimposed preeclampsia    Chronic Hypertension, rule out superimposed preeclampsia  - home Bps and BP in clinic today in 160s/90s  - observation on antepartum unit for close BP monitoring,  uptitration of medications, and rule out preeclampsia. In clinic plan was to add labetalol to BP regimen, however blood pressures have been normal range since presentation to hospital  - PreE labs wnl, P:C 0.17  - GBS collected  - betamethasone course started for FLM due to increased risk of  delivery due to poorly controlled hypertension  - will consider MFM consultation tomorrow, pending blood pressures over night  - If severe range BP or persistently elevated will add PO labetalol to current regimen of procardia XL 60mg BID  - of note, most recent US in clinic showed baby in breech position on Friday    Dispo: inpatient observation for BP monitoring and r/o preeclampsia    Dr. Guardado is on call for [facility] tonight, please page Dr. Guardado with any concerns until 0800    Myesha Tellez MD  [facility], [facility]  12/10/2024, 5:28 PM

## 2024-12-11 LAB
ABO + RH BLD: NORMAL
ALBUMIN SERPL BCG-MCNC: 3.2 G/DL (ref 3.5–5.2)
ALP SERPL-CCNC: 114 U/L (ref 40–150)
ALT SERPL W P-5'-P-CCNC: 11 U/L (ref 0–50)
ANION GAP SERPL CALCULATED.3IONS-SCNC: 12 MMOL/L (ref 7–15)
AST SERPL W P-5'-P-CCNC: 13 U/L (ref 0–45)
BILIRUB SERPL-MCNC: 0.2 MG/DL
BLD GP AB SCN SERPL QL: NEGATIVE
BUN SERPL-MCNC: 10.6 MG/DL (ref 6–20)
CALCIUM SERPL-MCNC: 8.3 MG/DL (ref 8.8–10.4)
CHLORIDE SERPL-SCNC: 102 MMOL/L (ref 98–107)
CREAT SERPL-MCNC: 0.51 MG/DL (ref 0.51–0.95)
EGFRCR SERPLBLD CKD-EPI 2021: >90 ML/MIN/1.73M2
ERYTHROCYTE [DISTWIDTH] IN BLOOD BY AUTOMATED COUNT: 14 % (ref 10–15)
GLUCOSE SERPL-MCNC: 167 MG/DL (ref 70–99)
GP B STREP DNA SPEC QL NAA+PROBE: NEGATIVE
HCO3 SERPL-SCNC: 21 MMOL/L (ref 22–29)
HCT VFR BLD AUTO: 29.7 % (ref 35–47)
HGB BLD-MCNC: 10.2 G/DL (ref 11.7–15.7)
MCH RBC QN AUTO: 29.6 PG (ref 26.5–33)
MCHC RBC AUTO-ENTMCNC: 34.3 G/DL (ref 31.5–36.5)
MCV RBC AUTO: 86 FL (ref 78–100)
PLATELET # BLD AUTO: 277 10E3/UL (ref 150–450)
POTASSIUM SERPL-SCNC: 3.5 MMOL/L (ref 3.4–5.3)
PROT SERPL-MCNC: 5.4 G/DL (ref 6.4–8.3)
RBC # BLD AUTO: 3.45 10E6/UL (ref 3.8–5.2)
SODIUM SERPL-SCNC: 135 MMOL/L (ref 135–145)
SPECIMEN EXP DATE BLD: NORMAL
WBC # BLD AUTO: 12 10E3/UL (ref 4–11)

## 2024-12-11 PROCEDURE — 250N000013 HC RX MED GY IP 250 OP 250 PS 637: Performed by: OBSTETRICS & GYNECOLOGY

## 2024-12-11 PROCEDURE — G0378 HOSPITAL OBSERVATION PER HR: HCPCS

## 2024-12-11 PROCEDURE — 85018 HEMOGLOBIN: CPT | Performed by: OBSTETRICS & GYNECOLOGY

## 2024-12-11 PROCEDURE — 250N000011 HC RX IP 250 OP 636: Performed by: STUDENT IN AN ORGANIZED HEALTH CARE EDUCATION/TRAINING PROGRAM

## 2024-12-11 PROCEDURE — 85041 AUTOMATED RBC COUNT: CPT | Performed by: OBSTETRICS & GYNECOLOGY

## 2024-12-11 PROCEDURE — 86900 BLOOD TYPING SEROLOGIC ABO: CPT | Performed by: OBSTETRICS & GYNECOLOGY

## 2024-12-11 PROCEDURE — 96372 THER/PROPH/DIAG INJ SC/IM: CPT | Performed by: STUDENT IN AN ORGANIZED HEALTH CARE EDUCATION/TRAINING PROGRAM

## 2024-12-11 PROCEDURE — 80053 COMPREHEN METABOLIC PANEL: CPT | Performed by: OBSTETRICS & GYNECOLOGY

## 2024-12-11 PROCEDURE — 36415 COLL VENOUS BLD VENIPUNCTURE: CPT | Performed by: OBSTETRICS & GYNECOLOGY

## 2024-12-11 RX ORDER — POTASSIUM CHLORIDE 1500 MG/1
20 TABLET, EXTENDED RELEASE ORAL ONCE
Status: COMPLETED | OUTPATIENT
Start: 2024-12-11 | End: 2024-12-11

## 2024-12-11 RX ORDER — POTASSIUM CHLORIDE 1500 MG/1
40 TABLET, EXTENDED RELEASE ORAL ONCE
Status: COMPLETED | OUTPATIENT
Start: 2024-12-11 | End: 2024-12-11

## 2024-12-11 RX ORDER — DOCUSATE SODIUM 100 MG/1
100 CAPSULE, LIQUID FILLED ORAL 2 TIMES DAILY
Status: DISCONTINUED | OUTPATIENT
Start: 2024-12-11 | End: 2024-12-12 | Stop reason: HOSPADM

## 2024-12-11 RX ADMIN — DOXYLAMINE SUCCINATE 25 MG: 25 TABLET ORAL at 20:14

## 2024-12-11 RX ADMIN — DOCUSATE SODIUM 100 MG: 100 CAPSULE, LIQUID FILLED ORAL at 20:14

## 2024-12-11 RX ADMIN — POTASSIUM CHLORIDE 20 MEQ: 1500 TABLET, EXTENDED RELEASE ORAL at 16:09

## 2024-12-11 RX ADMIN — BETAMETHASONE ACETATE AND BETAMETHASONE SODIUM PHOSPHATE 12 MG: 3; 3 INJECTION, SUSPENSION INTRA-ARTICULAR; INTRALESIONAL; INTRAMUSCULAR; SOFT TISSUE at 17:44

## 2024-12-11 RX ADMIN — POTASSIUM CHLORIDE 40 MEQ: 1500 TABLET, EXTENDED RELEASE ORAL at 14:02

## 2024-12-11 RX ADMIN — ASPIRIN 81 MG CHEWABLE TABLET 81 MG: 81 TABLET CHEWABLE at 08:35

## 2024-12-11 RX ADMIN — PRENATAL VITAMINS-IRON FUMARATE 27 MG IRON-FOLIC ACID 0.8 MG TABLET 1 TABLET: at 08:35

## 2024-12-11 RX ADMIN — DEXTROAMPHETAMINE SACCHARATE, AMPHETAMINE ASPARTATE MONOHYDRATE, DEXTROAMPHETAMINE SULFATE, AND AMPHETAMINE SULFATE 30 MG: 5; 5; 5; 5 CAPSULE ORAL at 08:34

## 2024-12-11 RX ADMIN — PANTOPRAZOLE SODIUM 40 MG: 40 TABLET, DELAYED RELEASE ORAL at 08:35

## 2024-12-11 RX ADMIN — NIFEDIPINE 60 MG: 30 TABLET, FILM COATED, EXTENDED RELEASE ORAL at 16:06

## 2024-12-11 RX ADMIN — NIFEDIPINE 60 MG: 30 TABLET, FILM COATED, EXTENDED RELEASE ORAL at 08:35

## 2024-12-11 ASSESSMENT — ACTIVITIES OF DAILY LIVING (ADL)
ADLS_ACUITY_SCORE: 18

## 2024-12-11 NOTE — PROVIDER NOTIFICATION
Vianey presents from clinic for observation for elevated Bps. Pt has chronic hypertension and takes procardia at home.     12/10/24 4385   Provider Notification   Provider Name/Title Dr Tellez   Method of Notification Phone     Serial Bps read to MD. Orders received to include overnight obs, betamethasone, GBS swab, PIV insertion, continuous monitoring overnight. Will plan for MFM consult tomorrow.

## 2024-12-11 NOTE — PLAN OF CARE
1220- Kevin REY notified of low potassium, plan to replace PO. HR has consistently been 105-115, which patient reports is a recent change in the last few days. Patient reports HR is typically in the 90's.  1230- Kevin REY at bedside discussing plan of care with patient. Plan to stay overnight, repeat Pre-e labs this evening, replace potassium with RN driven protocol.

## 2024-12-11 NOTE — PLAN OF CARE
1915  Report received from Wandy STEVENS RN.  Assuming care at this time.      1930  Patient stating it is her bedtime and requested Unisom and nifedipine.  Plan of care discussed with patient and spouse including continuous monitoring and need to adjust monitors during night.  Patient reports she has an anterior placenta.      0445  RN to patient bedside to check vital signs.  Patient sitting semi-fowlers in bed wide awake.  She states she is an early riser.  BP noted.      0700  External ultrasound adjusted multiple times during shift in attempt to maintain continuous fetal tracing.  Anterior placenta has made maintaining continuous tracing difficult.      0720  Report to Faith MASTERSON RN.  Care transferred at this time.

## 2024-12-11 NOTE — PLAN OF CARE
"Patient denies ctx, cramping, vaginal bleeding, leaking of fluid, endorses +fetal movement. Denies headache, vision changes, ringing in ears and epigastric pain at this time, does endorse a light headache when she woke up this AM that resolved on its own.   On Saturday 12/7 she did notice RUQ pain and blurry vision but resolved on its own.   Patient reports decrease in edema in feet and ankles but notes that ankles feel \"full\", RN discussed wearing SCD's while in bed and placed on patient. When asked about urine output, patient reports urine has been \"darker\" over the last few days, RN placed hat in toilet and educated patient on recording oral intake.   When RN assumed patient care this AM, patient had already taken her own levothyroxine from home, RN educated on importance of not taking medications from home that have not been verified by pharmacy, patient in agreement that she will no longer take any medications from home, and instead request them from RN.   At 0820, Kevin REY in department and gave order to remove continuous fetal monitoring, planning to round on patient later this afternoon.   Patient due to receive 2nd betamethasone at 1730 this evening. All questions and concerns addressed at this time.  "

## 2024-12-11 NOTE — PROGRESS NOTES
Vianey Schmidt   22  HD #2  Antepartum progress notes:    S: pt states that she is doing well. Feeling great, swelling improved, denies headaches, change in vision, ruq pain. She does notice that her heart rate has felt a bit elevated over the last week. Denies SOB.    O: BP (!) 141/79 (BP Location: Right arm, Patient Position: Semi-Sneed's, Cuff Size: Adult Regular)   Pulse 103   Temp 98.2  F (36.8  C) (Temporal)   Resp 20   Ht 1.524 m (5')   Wt 90.8 kg (200 lb 4 oz)   LMP 2024   BMI 39.11 kg/m     Gen; alert, NAD  Abd: soft, non tender  Ext: mild edema, non pitting, no erythema    FHT: 140 mod vianca, reactive  [place]: none    Labs:     12/10/24 193  Protein  random urine  Collected: 12/10/24 1908  Final result  Specimen: Urine, Clean Catch    Total Protein Urine mg/dL 25.0 mg/dL  Creatinine Urine mg/dL 149.5 mg/dL    Total Protein Urine mg/mg Creat 0.17 mg/mg Cr            12/10/24 1830  Comprehensive metabolic panel  Collected: 12/10/24 1750  Final result  Specimen: Blood from Arm, Right    Sodium 135 mmol/L Chloride 100 mmol/L   Potassium 3.0 Low  mmol/L Glucose 93 mg/dL   Carbon Dioxide (CO2) 23 mmol/L Alkaline Phosphatase 126 U/L   Anion Gap 12 mmol/L AST 15 U/L   Urea Nitrogen 10.9 mg/dL ALT 13 U/L   Creatinine 0.41 Low  mg/dL Protein Total 5.9 Low  g/dL   GFR Estimate >90 mL/min/1.73m2  Albumin 3.4 Low  g/dL   Calcium 8.8 mg/dL  Bilirubin Total 0.2 mg/dL          12/10/24 1810  CBC with platelets  Collected: 12/10/24 1750  Final result  Specimen: Blood from Arm, Right    WBC Count 12.6 High  10e3/uL MCH 28.7 pg   RBC Count 3.87 10e6/uL MCHC 33.3 g/dL   Hemoglobin 11.1 Low  g/dL RDW 13.9 %   Hematocrit 33.3 Low  % Platelet Count 271 10e3/uL   MCV 86 fL            A/p:  Vianey Schmidt is a 34 year old  at 32w5d here for inpatient observation for CHTN, rule out superimposed preeclampsia HD #2    Chronic Hypertension, rule out superimposed preeclampsia  - home Bps and BP in clinic  yesterday - 160s/90s  - observation on antepartum unit for close BP monitoring  - increase Procardia xl from 30 mg daily to now 60 mg twice daily  - PreE labs wnl, P:C 0.17  - GBS collected  - betamethasone course started for FLM due to increased risk of  delivery due to poorly controlled hypertension. Second dose due today at ~1700  - will consider MFM consultation tomorrow per admitting provider (MFM not here today and pt is stable)  - If severe range BP or persistently elevated will add PO labetalol to current regimen of procardia XL 60mg BID  - of note, most recent US in clinic showed baby in breech position on Friday    Hypokalemia  - plan oral replacement    Tachycardia  -101 -123  - could be due to increase of nifedipine vs Adderall XR use, normal pregnancy changes vs dehydration vs hypokalemia  - continue to monitor especially after potassium replacement  - if persistent, could consider labetalol for this indication as well as htn.       Deven Brown MD

## 2024-12-12 VITALS
DIASTOLIC BLOOD PRESSURE: 85 MMHG | HEART RATE: 103 BPM | SYSTOLIC BLOOD PRESSURE: 146 MMHG | OXYGEN SATURATION: 99 % | RESPIRATION RATE: 16 BRPM | WEIGHT: 201 LBS | TEMPERATURE: 97.9 F | HEIGHT: 60 IN | BODY MASS INDEX: 39.46 KG/M2

## 2024-12-12 LAB — POTASSIUM SERPL-SCNC: 3.5 MMOL/L (ref 3.4–5.3)

## 2024-12-12 PROCEDURE — 250N000013 HC RX MED GY IP 250 OP 250 PS 637: Performed by: OBSTETRICS & GYNECOLOGY

## 2024-12-12 PROCEDURE — 84132 ASSAY OF SERUM POTASSIUM: CPT | Performed by: STUDENT IN AN ORGANIZED HEALTH CARE EDUCATION/TRAINING PROGRAM

## 2024-12-12 PROCEDURE — G0378 HOSPITAL OBSERVATION PER HR: HCPCS

## 2024-12-12 RX ADMIN — PRENATAL VITAMINS-IRON FUMARATE 27 MG IRON-FOLIC ACID 0.8 MG TABLET 1 TABLET: at 08:00

## 2024-12-12 RX ADMIN — DOCUSATE SODIUM 100 MG: 100 CAPSULE, LIQUID FILLED ORAL at 08:00

## 2024-12-12 RX ADMIN — PANTOPRAZOLE SODIUM 40 MG: 40 TABLET, DELAYED RELEASE ORAL at 08:00

## 2024-12-12 RX ADMIN — ASPIRIN 81 MG CHEWABLE TABLET 81 MG: 81 TABLET CHEWABLE at 08:00

## 2024-12-12 RX ADMIN — NIFEDIPINE 60 MG: 30 TABLET, FILM COATED, EXTENDED RELEASE ORAL at 08:00

## 2024-12-12 RX ADMIN — DEXTROAMPHETAMINE SACCHARATE, AMPHETAMINE ASPARTATE MONOHYDRATE, DEXTROAMPHETAMINE SULFATE, AND AMPHETAMINE SULFATE 30 MG: 5; 5; 5; 5 CAPSULE ORAL at 08:34

## 2024-12-12 ASSESSMENT — ACTIVITIES OF DAILY LIVING (ADL)
ADLS_ACUITY_SCORE: 18

## 2024-12-12 NOTE — PLAN OF CARE
Monitoring performed prior to discharge. Reviewed discharge instructions at 1005. BP taken after monitoring (1006) was 144/86. Set automatic BP for every 10 min. Recheck was 155/89, and 146/85. This RN went into the room to update pt that I will touch base with  to see what she suggest about BP's. Pt stating that she is going home and will take it her BP again at home. This RN asked pt if she would stay until after I talk to Dr. Carlson to come up with a plan and she said no and that she is all in her head and that's why her BP are high. This RN instructed pt to recheck pt again at home around 2pm and update clinic.     Page sent to MD to call unit. Update Dr. Carlson at 1038, on situation and pt leaving. MD will reach out to Dr. Medina (pt's primary) and their triage time to reach out to pt to assess BP. Pt has follow up appt at clinic tomorrow.

## 2024-12-12 NOTE — PROGRESS NOTES
VSS, afebrile. Evening Potassium WDL, no further replacement needed overnight per protocol. Recheck labs this am. Denies headache, visual changes or epigastric pain. Endorses fetal movement. Rested overnight. Spouse supportive at bedside.

## 2024-12-12 NOTE — PROGRESS NOTES
"[facility]  Obstetrics - Antepartum    Vianey Schmidt  1990  [id number]    A/P:  Vianey Schmidt is a 34 year old  at 32w6d here for inpatient observation for CHTN exacerbation, rule out superimposed preeclampsia HD #3     Chronic Hypertension, rule out superimposed preeclampsia  - admitted in the setting of severe range BPs despite recent escalation in oral anti-hypertensive meds  - had been on nifedipine XL 30 mg BID (through thorough chart review it appears she this was BID although it was recorded in her prenatal plan as once daily) prior to pregnancy and through pregnancy until  when it was increased to 60 mg QAM and 30 mg at bedtime, later increased to 60 mg BID on , admitted 12/10 with plan to add labetalol but not needed based on blood pressure trend  - serum preeclampsia labs wnl, urine P:C in equivocal range  - asymptomatic     Hypokalemia  - replaced orally, recheck today prior to discharge     Tachycardia  -101 -123  - could be due to increase of nifedipine vs Adderall XR use, normal pregnancy changes vs dehydration vs hypokalemia  - continue to monitor especially after potassium replacement  - asymptomatic    IUP @ 32+6  - s/p BMZ 12/10 and   - GBS neg  - breech as of last ultrasound - re-scan if delivery indicated    Dispo:  Given normotension, normal preeclampsia labs, asymptomatic, okay for discharge to home today with close follow-up outpatient (next appointment is tomorrow). Discussed that given the escalation in anti-hypertensive medication dosing, if blood pressures due continue to rise, we would discuss risks and benefits of early delivery vs readmission for continued inpatient observation and management.     Subjective:  Feeling well. No HA, vision changes, CP, SOB, RUQ/epigastric pain, ctx, LOF, VB. Really wants to be discharged today (noted to RN that if it was recommended for her to stay she would \"politely decline\").     O:   /81 (BP " Location: Right arm, Patient Position: Semi-Sneed's)   Pulse 103   Temp 97.9  F (36.6  C) (Temporal)   Resp 16   Ht 1.524 m (5')   Wt 91.2 kg (201 lb)   LMP 04/26/2024   SpO2 99%   BMI 39.26 kg/m    Gen: NAD    FHT earlier this morning 135/mod/+accels/no decels  [place]: actx    AST 13  ALT 11  Creatinine 0.51  Plts 277      Santa Carlson MD  12/12/2024 0909

## 2024-12-12 NOTE — DISCHARGE INSTRUCTIONS
Please call the office if blood pressures are consistently greater than 140/90 or if you have persistent headache, vision changes, chest pain, shortness of breath, pain in your upper abdomen, contractions, loss of fluid, bleeding, or reduced fetal movement.

## 2024-12-12 NOTE — PROVIDER NOTIFICATION
12/12/24 1003   Provider Notification   Provider Name/Title Baldemar Carlson   Method of Notification Electronic Page   Request Evaluate in Person   Notification Reason Lab/Diagnostic Study     Dr. Carlson updated on AM potassium 3.5.. Will continue to proceed with discharge plan,

## 2024-12-12 NOTE — DISCHARGE SUMMARY
[facility]    Discharge Summary  Obstetrics - Antepartum Admission    Date of Admission:  12/10/2024  Date of Discharge:  2024  Discharging Provider: Santa Carlson MD    Discharge Diagnoses   CHTN exacerbation    History of Present Illness   Vianey Schmidt is a 34 year old  who was admitted @ 32+4 for observation and work-up due to CHTN exacerbation to r/o superimposed preeclampsia after recent need for significant escalation in anti-hypertensive medication dosing to nifedipine XL 60 mg BID. She did not require additional modification to her regimen throughout her stay. Preeclampsia labs were wnl (equivocal urine P:C) and she was asymptomatic. Fetal status reassuring. She will follow-up closely in the office.       Post-partum hemoglobin:   Hemoglobin   Date Value Ref Range Status   2024 10.2 (L) 11.7 - 15.7 g/dL Final       Santa Carlson MD    2024 1030    Discharge Disposition   Discharged to home   Condition at discharge: Stable    Primary Care Physician   Catherine Sanchez        Discharge Orders      Reason for your hospital stay    Exacerbation of hypertension     Activity    No need for bed rest but reduce stress and avoid vigorous/overwhelming activities     Discharge Medications   Current Discharge Medication List        CONTINUE these medications which have CHANGED    Details   NIFEdipine ER OSMOTIC (ADALAT CC) 60 MG 24 hr tablet Take 1 tablet (60 mg) by mouth 2 times daily.  Qty: 60 tablet, Refills: 0    Associated Diagnoses: Chronic hypertension           CONTINUE these medications which have NOT CHANGED    Details   amphetamine-dextroamphetamine (ADDERALL XR) 30 MG 24 hr capsule Take 30 mg by mouth daily      aspirin (ASA) 81 MG chewable tablet Take 81 mg by mouth daily.      Calcium Polycarbophil (FIBER) 625 MG tablet Take by mouth daily.      doxylamine (UNISOM) 25 MG TABS tablet Take 25 mg by mouth nightly as needed for sleep.      levothyroxine  (SYNTHROID/LEVOTHROID) 100 MCG tablet Take 1 tablet by mouth daily at 2 pm      omeprazole (PRILOSEC) 20 MG DR capsule Take 20 mg by mouth daily.      Prenatal Vit-Fe Fumarate-FA (PRENATAL MULTIVITAMIN  PLUS IRON) 27-1 MG TABS Take by mouth daily.           Allergies   No Known Allergies    Santa Carlson MD

## 2024-12-31 ENCOUNTER — HOSPITAL ENCOUNTER (INPATIENT)
Facility: CLINIC | Age: 34
End: 2024-12-31
Attending: OBSTETRICS & GYNECOLOGY | Admitting: OBSTETRICS & GYNECOLOGY
Payer: COMMERCIAL

## 2024-12-31 DIAGNOSIS — Z98.891 S/P PRIMARY LOW TRANSVERSE C-SECTION: ICD-10-CM

## 2024-12-31 DIAGNOSIS — O14.13 SEVERE PRE-ECLAMPSIA IN THIRD TRIMESTER: ICD-10-CM

## 2024-12-31 LAB
ALBUMIN MFR UR ELPH: 13.9 MG/DL
ALBUMIN SERPL BCG-MCNC: 3.4 G/DL (ref 3.5–5.2)
ALP SERPL-CCNC: 160 U/L (ref 40–150)
ALT SERPL W P-5'-P-CCNC: 16 U/L (ref 0–50)
ANION GAP SERPL CALCULATED.3IONS-SCNC: 13 MMOL/L (ref 7–15)
AST SERPL W P-5'-P-CCNC: 18 U/L (ref 0–45)
BILIRUB SERPL-MCNC: 0.2 MG/DL
BUN SERPL-MCNC: 13.6 MG/DL (ref 6–20)
CALCIUM SERPL-MCNC: 8.9 MG/DL (ref 8.8–10.4)
CHLORIDE SERPL-SCNC: 104 MMOL/L (ref 98–107)
CREAT SERPL-MCNC: 0.37 MG/DL (ref 0.51–0.95)
CREAT UR-MCNC: 87.7 MG/DL
EGFRCR SERPLBLD CKD-EPI 2021: >90 ML/MIN/1.73M2
ERYTHROCYTE [DISTWIDTH] IN BLOOD BY AUTOMATED COUNT: 14.3 % (ref 10–15)
GLUCOSE SERPL-MCNC: 86 MG/DL (ref 70–99)
HCO3 SERPL-SCNC: 22 MMOL/L (ref 22–29)
HCT VFR BLD AUTO: 33.5 % (ref 35–47)
HGB BLD-MCNC: 11.4 G/DL (ref 11.7–15.7)
MCH RBC QN AUTO: 28.8 PG (ref 26.5–33)
MCHC RBC AUTO-ENTMCNC: 34 G/DL (ref 31.5–36.5)
MCV RBC AUTO: 85 FL (ref 78–100)
PLATELET # BLD AUTO: 274 10E3/UL (ref 150–450)
POTASSIUM SERPL-SCNC: 3.6 MMOL/L (ref 3.4–5.3)
PROT SERPL-MCNC: 6 G/DL (ref 6.4–8.3)
PROT/CREAT 24H UR: 0.16 MG/MG CR (ref 0–0.2)
RBC # BLD AUTO: 3.96 10E6/UL (ref 3.8–5.2)
SODIUM SERPL-SCNC: 139 MMOL/L (ref 135–145)
WBC # BLD AUTO: 10.6 10E3/UL (ref 4–11)

## 2024-12-31 PROCEDURE — 36415 COLL VENOUS BLD VENIPUNCTURE: CPT | Performed by: STUDENT IN AN ORGANIZED HEALTH CARE EDUCATION/TRAINING PROGRAM

## 2024-12-31 PROCEDURE — 85014 HEMATOCRIT: CPT | Performed by: STUDENT IN AN ORGANIZED HEALTH CARE EDUCATION/TRAINING PROGRAM

## 2024-12-31 PROCEDURE — 82310 ASSAY OF CALCIUM: CPT | Performed by: STUDENT IN AN ORGANIZED HEALTH CARE EDUCATION/TRAINING PROGRAM

## 2024-12-31 PROCEDURE — 84156 ASSAY OF PROTEIN URINE: CPT | Performed by: STUDENT IN AN ORGANIZED HEALTH CARE EDUCATION/TRAINING PROGRAM

## 2024-12-31 PROCEDURE — 86780 TREPONEMA PALLIDUM: CPT | Performed by: STUDENT IN AN ORGANIZED HEALTH CARE EDUCATION/TRAINING PROGRAM

## 2024-12-31 PROCEDURE — 250N000011 HC RX IP 250 OP 636: Performed by: OBSTETRICS & GYNECOLOGY

## 2024-12-31 PROCEDURE — 85041 AUTOMATED RBC COUNT: CPT | Performed by: STUDENT IN AN ORGANIZED HEALTH CARE EDUCATION/TRAINING PROGRAM

## 2024-12-31 PROCEDURE — 250N000013 HC RX MED GY IP 250 OP 250 PS 637: Performed by: STUDENT IN AN ORGANIZED HEALTH CARE EDUCATION/TRAINING PROGRAM

## 2024-12-31 PROCEDURE — 250N000013 HC RX MED GY IP 250 OP 250 PS 637: Performed by: OBSTETRICS & GYNECOLOGY

## 2024-12-31 PROCEDURE — 3E0DXGC INTRODUCTION OF OTHER THERAPEUTIC SUBSTANCE INTO MOUTH AND PHARYNX, EXTERNAL APPROACH: ICD-10-PCS | Performed by: OBSTETRICS & GYNECOLOGY

## 2024-12-31 PROCEDURE — 258N000003 HC RX IP 258 OP 636: Performed by: OBSTETRICS & GYNECOLOGY

## 2024-12-31 RX ORDER — LEVOTHYROXINE SODIUM 100 UG/1
100 TABLET ORAL
Status: DISCONTINUED | OUTPATIENT
Start: 2025-01-01 | End: 2025-01-05 | Stop reason: HOSPADM

## 2024-12-31 RX ORDER — CALCIUM GLUCONATE 94 MG/ML
1 INJECTION, SOLUTION INTRAVENOUS
Status: DISCONTINUED | OUTPATIENT
Start: 2024-12-31 | End: 2025-01-05 | Stop reason: HOSPADM

## 2024-12-31 RX ORDER — LABETALOL HYDROCHLORIDE 5 MG/ML
20-40 INJECTION, SOLUTION INTRAVENOUS EVERY 10 MIN PRN
Status: DISCONTINUED | OUTPATIENT
Start: 2024-12-31 | End: 2024-12-31

## 2024-12-31 RX ORDER — HYDRALAZINE HYDROCHLORIDE 20 MG/ML
5-10 INJECTION INTRAMUSCULAR; INTRAVENOUS
Status: DISCONTINUED | OUTPATIENT
Start: 2024-12-31 | End: 2025-01-05 | Stop reason: HOSPADM

## 2024-12-31 RX ORDER — MAGNESIUM SULFATE HEPTAHYDRATE 40 MG/ML
4 INJECTION, SOLUTION INTRAVENOUS
Status: DISCONTINUED | OUTPATIENT
Start: 2024-12-31 | End: 2025-01-05 | Stop reason: HOSPADM

## 2024-12-31 RX ORDER — HYDRALAZINE HYDROCHLORIDE 10 MG/1
10 TABLET, FILM COATED ORAL 4 TIMES DAILY
COMMUNITY

## 2024-12-31 RX ORDER — DOCUSATE SODIUM 100 MG/1
100 CAPSULE, LIQUID FILLED ORAL 2 TIMES DAILY
Status: DISCONTINUED | OUTPATIENT
Start: 2024-12-31 | End: 2025-01-05 | Stop reason: HOSPADM

## 2024-12-31 RX ORDER — NIFEDIPINE 30 MG/1
60 TABLET, EXTENDED RELEASE ORAL
Status: DISCONTINUED | OUTPATIENT
Start: 2024-12-31 | End: 2025-01-03

## 2024-12-31 RX ORDER — TERBUTALINE SULFATE 1 MG/ML
0.25 INJECTION, SOLUTION SUBCUTANEOUS
Status: DISCONTINUED | OUTPATIENT
Start: 2024-12-31 | End: 2025-01-03 | Stop reason: HOSPADM

## 2024-12-31 RX ORDER — MAGNESIUM SULFATE IN WATER 40 MG/ML
2 INJECTION, SOLUTION INTRAVENOUS CONTINUOUS
Status: DISCONTINUED | OUTPATIENT
Start: 2024-12-31 | End: 2025-01-05 | Stop reason: HOSPADM

## 2024-12-31 RX ORDER — HYDROXYZINE HYDROCHLORIDE 25 MG/1
50-100 TABLET, FILM COATED ORAL EVERY 6 HOURS PRN
Status: DISCONTINUED | OUTPATIENT
Start: 2024-12-31 | End: 2025-01-05 | Stop reason: HOSPADM

## 2024-12-31 RX ORDER — MISOPROSTOL 100 UG/1
25 TABLET ORAL
Status: COMPLETED | OUTPATIENT
Start: 2024-12-31 | End: 2025-01-01

## 2024-12-31 RX ORDER — MAGNESIUM SULFATE HEPTAHYDRATE 40 MG/ML
2 INJECTION, SOLUTION INTRAVENOUS
Status: DISCONTINUED | OUTPATIENT
Start: 2024-12-31 | End: 2025-01-05 | Stop reason: HOSPADM

## 2024-12-31 RX ORDER — SODIUM CHLORIDE, SODIUM LACTATE, POTASSIUM CHLORIDE, CALCIUM CHLORIDE 600; 310; 30; 20 MG/100ML; MG/100ML; MG/100ML; MG/100ML
INJECTION, SOLUTION INTRAVENOUS CONTINUOUS
Status: DISCONTINUED | OUTPATIENT
Start: 2024-12-31 | End: 2025-01-03

## 2024-12-31 RX ORDER — HYDRALAZINE HYDROCHLORIDE 10 MG/1
10 TABLET, FILM COATED ORAL 4 TIMES DAILY
Status: DISCONTINUED | OUTPATIENT
Start: 2024-12-31 | End: 2025-01-03

## 2024-12-31 RX ORDER — LIDOCAINE 40 MG/G
CREAM TOPICAL
Status: DISCONTINUED | OUTPATIENT
Start: 2024-12-31 | End: 2025-01-05 | Stop reason: HOSPADM

## 2024-12-31 RX ORDER — MAGNESIUM SULFATE HEPTAHYDRATE 40 MG/ML
4 INJECTION, SOLUTION INTRAVENOUS ONCE
Status: COMPLETED | OUTPATIENT
Start: 2024-12-31 | End: 2024-12-31

## 2024-12-31 RX ADMIN — SODIUM CHLORIDE, POTASSIUM CHLORIDE, SODIUM LACTATE AND CALCIUM CHLORIDE: 600; 310; 30; 20 INJECTION, SOLUTION INTRAVENOUS at 18:05

## 2024-12-31 RX ADMIN — DOCUSATE SODIUM 100 MG: 100 CAPSULE, LIQUID FILLED ORAL at 20:23

## 2024-12-31 RX ADMIN — HYDRALAZINE HYDROCHLORIDE 10 MG: 10 TABLET ORAL at 18:16

## 2024-12-31 RX ADMIN — MAGNESIUM SULFATE IN WATER 2 G/HR: 20 INJECTION, SOLUTION INTRAVENOUS at 18:15

## 2024-12-31 RX ADMIN — NIFEDIPINE 60 MG: 30 TABLET, FILM COATED, EXTENDED RELEASE ORAL at 17:49

## 2024-12-31 RX ADMIN — MISOPROSTOL 25 MCG: 100 TABLET ORAL at 19:31

## 2024-12-31 RX ADMIN — HYDROXYZINE HYDROCHLORIDE 50 MG: 25 TABLET ORAL at 20:23

## 2024-12-31 RX ADMIN — MISOPROSTOL 25 MCG: 100 TABLET ORAL at 17:23

## 2024-12-31 RX ADMIN — MAGNESIUM SULFATE HEPTAHYDRATE 4 G: 40 INJECTION, SOLUTION INTRAVENOUS at 17:50

## 2024-12-31 RX ADMIN — MISOPROSTOL 25 MCG: 100 TABLET ORAL at 23:24

## 2024-12-31 RX ADMIN — MISOPROSTOL 25 MCG: 100 TABLET ORAL at 21:34

## 2024-12-31 ASSESSMENT — ACTIVITIES OF DAILY LIVING (ADL)
ADLS_ACUITY_SCORE: 18

## 2024-12-31 NOTE — H&P
OB HISTORY AND PHYSICAL    Patient Name: Vianey Schmidt   Admit Date: 1231  MR #: [id number]   Acct #: [id number]   : 1990     Chief Complaint/Reason for Visit: direct admit from office for mIOL    History of Present Illness: Vianey Schmidt is a 34 year old  at 35w4d here as a direct admit by Dr. Tellez from the office for worsening cHTN, now w/ SI preE w/ SF. She has been on Procardia XL since prior to pregnancy but this was increased to 60mg PO BID at 32w and had Labetalol added last week for worsening control, but had adverse reaction to that (allergy added to chart) and switched to Hydralazine 10mg PO 4x/day instead. Despite this, BPs have continued to worsen and in office today, pt with multiple severe-range BPs and also noted a persistent HA. No VC or RUQ pain. At present, the HA is currently resolved and BPs improved. She was for PLTCS for breech, but US in office today showed baby 89%ile and CEPHALIC. No ctx, VB, LOF. +FM.    This pregnancy has also been complicated by ADHD on Adderall XL and Hypothyroidism on Levothyroxine. She did genetic testing this preg with cell-free fetal DNA which was WNL and known XX/female (will be Nataliia), MS AFP which was WNL, and Level II w/ MFM which was WNL. She passed her 1hr GCT. She did receive BMZ course at 32w (12/10, ) and GBS at that time was negative. She is Rh pos.    Review of Systems:  General: denies fevers  CV: denies CP  Pulm: denies SOB  Neuro: denies HA  Abd: denies N/V  Gyn: denies vaginal discharge  OB: see HPI  Skin: denies rashes  MSK: denies calf pain  Psych: denies depression     History:   OB History    Para Term  AB Living   4 0 0 0 3 0   SAB IAB Ectopic Multiple Live Births   3 0 0 0 0      # Outcome Date GA Lbr Guilherme/2nd Weight Sex Type Anes PTL Lv   4 Current            3 SAB            2 SAB            1 SAB                Past Medical History:   Diagnosis Date    ADHD (attention deficit hyperactivity disorder),  combined type     Hypertension     Thyroid disease        Past Surgical History:   Procedure Laterality Date    BIOPSY/REMOVAL, LYMPH NODE(S)      11 yo, neck    DILATION AND CURETTAGE      x2       History reviewed. No pertinent family history.    Social History     Socioeconomic History    Marital status:      Spouse name: Not on file    Number of children: Not on file    Years of education: Not on file    Highest education level: Not on file   Occupational History    Not on file   Tobacco Use    Smoking status: Never     Passive exposure: Never    Smokeless tobacco: Never   Substance and Sexual Activity    Alcohol use: Not Currently    Drug use: Never    Sexual activity: Yes     Partners: Male   Other Topics Concern    Not on file   Social History Narrative    Not on file     Social Drivers of Health     Financial Resource Strain: Low Risk  (12/31/2024)    Financial Resource Strain     Within the past 12 months, have you or your family members you live with been unable to get utilities (heat, electricity) when it was really needed?: No   Food Insecurity: Low Risk  (12/31/2024)    Food Insecurity     Within the past 12 months, did you worry that your food would run out before you got money to buy more?: No     Within the past 12 months, did the food you bought just not last and you didn t have money to get more?: No   Transportation Needs: Low Risk  (12/31/2024)    Transportation Needs     Within the past 12 months, has lack of transportation kept you from medical appointments, getting your medicines, non-medical meetings or appointments, work, or from getting things that you need?: No   Physical Activity: Not on file   Stress: Not on file   Social Connections: Not on file   Interpersonal Safety: Low Risk  (12/31/2024)    Interpersonal Safety     Do you feel physically and emotionally safe where you currently live?: Yes     Within the past 12 months, have you been hit, slapped, kicked or otherwise  physically hurt by someone?: No     Within the past 12 months, have you been humiliated or emotionally abused in other ways by your partner or ex-partner?: No   Housing Stability: Low Risk  (12/31/2024)    Housing Stability     Do you have housing? : Yes     Are you worried about losing your housing?: No       Allergy Information:        I have reviewed the patient's allergies.         Allergies   Allergen Reactions    Labetalol Shortness Of Breath        Home Medications:   No current outpatient medications on file.       Physical Examination:  Vitals:  Temp:  [98  F (36.7  C)] 98  F (36.7  C)  Resp:  [18] 18  BP: (130)/(76) 130/76  SpO2:  [96 %] 96 %    Exam:  General: no acute distress  Head: normocephalic, atraumatic  Eyes: EOMI  CV: pulses intact  Pulm: no increased effort  Neuro: CN II-XII grossly intact  Abd: gravid, soft, NTTP  Gyn: unfavorable in office  Skin: no rashes  MSK: no calf tenderness  Psych: AOx3, appropriate mood/affect    Fetus: FHT: tracing approp, reactive    Laboratory and Additional Data Reviewed:  Any associated labs, path, imaging personally reviewed  Results for orders placed or performed during the hospital encounter of 12/31/24   CBC with platelets     Status: Abnormal   Result Value Ref Range    WBC Count 10.6 4.0 - 11.0 10e3/uL    RBC Count 3.96 3.80 - 5.20 10e6/uL    Hemoglobin 11.4 (L) 11.7 - 15.7 g/dL    Hematocrit 33.5 (L) 35.0 - 47.0 %    MCV 85 78 - 100 fL    MCH 28.8 26.5 - 33.0 pg    MCHC 34.0 31.5 - 36.5 g/dL    RDW 14.3 10.0 - 15.0 %    Platelet Count 274 150 - 450 10e3/uL   Comprehensive Metabolic Panel     Status: Abnormal   Result Value Ref Range    Sodium 139 135 - 145 mmol/L    Potassium 3.6 3.4 - 5.3 mmol/L    Carbon Dioxide (CO2) 22 22 - 29 mmol/L    Anion Gap 13 7 - 15 mmol/L    Urea Nitrogen 13.6 6.0 - 20.0 mg/dL    Creatinine 0.37 (L) 0.51 - 0.95 mg/dL    GFR Estimate >90 >60 mL/min/1.73m2    Calcium 8.9 8.8 - 10.4 mg/dL    Chloride 104 98 - 107 mmol/L    Glucose  86 70 - 99 mg/dL    Alkaline Phosphatase 160 (H) 40 - 150 U/L    AST 18 0 - 45 U/L    ALT 16 0 - 50 U/L    Protein Total 6.0 (L) 6.4 - 8.3 g/dL    Albumin 3.4 (L) 3.5 - 5.2 g/dL    Bilirubin Total 0.2 <=1.2 mg/dL         Assessment and Plan: Vianey Schmidt is a 34 year old  at 35w4d here for mIOL for worsening cHTN w/ SI preE w/ SF (by sx criteria and BPs).  - Admit to L&D  - Cont home Procardia XL 60mg PO BID and Hydralazine 10mg PO QID (4x/day)  - Treat repeat severe-range BPs PRN with Nifedipine (IR) PO or Hydralazine IV protocols, can't use Labetalol protocol  - Start Mag 4g load, 2g/hr per routine  - Check preE labs on admit now (serum labs WNL, urine pending), repeat as clinically indicated  - Start ripening with Cytotec. Pt states d/w Dr. Tellez in office already and desires PO route, will order  - Tracing Cat 1  - Pain: fentanyl, nitrous, epidural PRN  - GBS neg  - Rh pos  - Pt declines NICU NNP consult currently, feels was well counseled by Dr. Tellez in office already on expectations  - ADHD: cont home Adderall XR  - Hypothyroidism: cont home Levothyroxine  - Otherwise routine intrapartum care      MD Rajesh Hebert [facility], PA  2024, 5:25 PM

## 2025-01-01 LAB — T PALLIDUM AB SER QL: NONREACTIVE

## 2025-01-01 PROCEDURE — 250N000013 HC RX MED GY IP 250 OP 250 PS 637: Performed by: OBSTETRICS & GYNECOLOGY

## 2025-01-01 PROCEDURE — 250N000011 HC RX IP 250 OP 636: Performed by: OBSTETRICS & GYNECOLOGY

## 2025-01-01 PROCEDURE — 250N000013 HC RX MED GY IP 250 OP 250 PS 637: Performed by: STUDENT IN AN ORGANIZED HEALTH CARE EDUCATION/TRAINING PROGRAM

## 2025-01-01 PROCEDURE — 3E0P7VZ INTRODUCTION OF HORMONE INTO FEMALE REPRODUCTIVE, VIA NATURAL OR ARTIFICIAL OPENING: ICD-10-PCS | Performed by: OBSTETRICS & GYNECOLOGY

## 2025-01-01 PROCEDURE — 120N000001 HC R&B MED SURG/OB

## 2025-01-01 RX ORDER — PANTOPRAZOLE SODIUM 40 MG/1
40 TABLET, DELAYED RELEASE ORAL
Status: DISCONTINUED | OUTPATIENT
Start: 2025-01-02 | End: 2025-01-01

## 2025-01-01 RX ORDER — ACETAMINOPHEN 325 MG/1
650 TABLET ORAL EVERY 4 HOURS PRN
Status: DISCONTINUED | OUTPATIENT
Start: 2025-01-01 | End: 2025-01-05 | Stop reason: HOSPADM

## 2025-01-01 RX ORDER — PANTOPRAZOLE SODIUM 40 MG/1
40 TABLET, DELAYED RELEASE ORAL
Status: DISCONTINUED | OUTPATIENT
Start: 2025-01-01 | End: 2025-01-05 | Stop reason: HOSPADM

## 2025-01-01 RX ORDER — MORPHINE SULFATE 10 MG/ML
10 INJECTION, SOLUTION INTRAMUSCULAR; INTRAVENOUS
Status: COMPLETED | OUTPATIENT
Start: 2025-01-01 | End: 2025-01-01

## 2025-01-01 RX ORDER — CALCIUM CARBONATE 500 MG/1
1000 TABLET, CHEWABLE ORAL 3 TIMES DAILY PRN
Status: DISCONTINUED | OUTPATIENT
Start: 2025-01-01 | End: 2025-01-05 | Stop reason: HOSPADM

## 2025-01-01 RX ADMIN — MISOPROSTOL 25 MCG: 100 TABLET ORAL at 03:22

## 2025-01-01 RX ADMIN — MISOPROSTOL 25 MCG: 100 TABLET ORAL at 01:39

## 2025-01-01 RX ADMIN — MAGNESIUM SULFATE IN WATER 2 G/HR: 20 INJECTION, SOLUTION INTRAVENOUS at 23:41

## 2025-01-01 RX ADMIN — HYDRALAZINE HYDROCHLORIDE 10 MG: 10 TABLET ORAL at 15:52

## 2025-01-01 RX ADMIN — LEVOTHYROXINE SODIUM 100 MCG: 100 TABLET ORAL at 07:29

## 2025-01-01 RX ADMIN — MAGNESIUM SULFATE IN WATER 2 G/HR: 20 INJECTION, SOLUTION INTRAVENOUS at 04:08

## 2025-01-01 RX ADMIN — HYDRALAZINE HYDROCHLORIDE 10 MG: 10 TABLET ORAL at 08:05

## 2025-01-01 RX ADMIN — NIFEDIPINE 60 MG: 30 TABLET, FILM COATED, EXTENDED RELEASE ORAL at 08:05

## 2025-01-01 RX ADMIN — MISOPROSTOL 25 MCG: 100 TABLET ORAL at 07:29

## 2025-01-01 RX ADMIN — DOCUSATE SODIUM 100 MG: 100 CAPSULE, LIQUID FILLED ORAL at 20:23

## 2025-01-01 RX ADMIN — HYDRALAZINE HYDROCHLORIDE 10 MG: 10 TABLET ORAL at 20:23

## 2025-01-01 RX ADMIN — MISOPROSTOL 25 MCG: 100 TABLET ORAL at 11:28

## 2025-01-01 RX ADMIN — MISOPROSTOL 25 MCG: 100 TABLET ORAL at 05:30

## 2025-01-01 RX ADMIN — MISOPROSTOL 25 MCG: 100 TABLET ORAL at 13:36

## 2025-01-01 RX ADMIN — PANTOPRAZOLE SODIUM 40 MG: 40 TABLET, DELAYED RELEASE ORAL at 10:58

## 2025-01-01 RX ADMIN — HYDRALAZINE HYDROCHLORIDE 10 MG: 10 TABLET ORAL at 11:28

## 2025-01-01 RX ADMIN — DOCUSATE SODIUM 100 MG: 100 CAPSULE, LIQUID FILLED ORAL at 08:05

## 2025-01-01 RX ADMIN — HYDROXYZINE HYDROCHLORIDE 50 MG: 25 TABLET ORAL at 17:57

## 2025-01-01 RX ADMIN — ACETAMINOPHEN 650 MG: 325 TABLET, FILM COATED ORAL at 03:57

## 2025-01-01 RX ADMIN — MORPHINE SULFATE 10 MG: 10 INJECTION, SOLUTION INTRAMUSCULAR; INTRAVENOUS at 17:57

## 2025-01-01 RX ADMIN — DINOPROSTONE 10 MG: 10 INSERT VAGINAL at 17:33

## 2025-01-01 RX ADMIN — MISOPROSTOL 25 MCG: 100 TABLET ORAL at 15:32

## 2025-01-01 RX ADMIN — NIFEDIPINE 60 MG: 30 TABLET, FILM COATED, EXTENDED RELEASE ORAL at 15:51

## 2025-01-01 RX ADMIN — DEXTROAMPHETAMINE SACCHARATE, AMPHETAMINE ASPARTATE MONOHYDRATE, DEXTROAMPHETAMINE SULFATE, AND AMPHETAMINE SULFATE 30 MG: 5; 5; 5; 5 CAPSULE ORAL at 08:05

## 2025-01-01 RX ADMIN — MISOPROSTOL 25 MCG: 100 TABLET ORAL at 09:31

## 2025-01-01 RX ADMIN — MAGNESIUM SULFATE IN WATER 2 G/HR: 20 INJECTION, SOLUTION INTRAVENOUS at 13:36

## 2025-01-01 ASSESSMENT — ACTIVITIES OF DAILY LIVING (ADL)
ADLS_ACUITY_SCORE: 23

## 2025-01-01 NOTE — PROGRESS NOTES
"LABOR PROGRESS NOTE    S:  Vianey Schmidt is feeling some mild cramping.     O:   Vitals:    25 1330 25 1334 25 1336 25 1431   BP:   136/81 (!) 143/85   BP Location:   Left arm    Patient Position:   Semi-Sneed's    Cuff Size:   Adult Regular    Resp:   18    Temp:       TempSrc:       SpO2:  96% 95%    Weight: 91.2 kg (201 lb)      Height: 1.549 m (5' 1\")          CT bpm, Variability: Moderate (6 - 25 bpm), Accelerations: Present and Decelerations: Absent  CTX: irritability    SVE:  FT/50/-4     A/P:  34 year old  at 35w5d    IUP: Category 1 FHT  Labor: s/p oral cytotec. If unchanged after final dose of oral cytotec switch to vaginal cervidil x12 hours. Continue to monitor.   Anticipate .     Denise Guardado MD  She/Her  [phone]  25 3:21 PM     "

## 2025-01-01 NOTE — PROVIDER NOTIFICATION
01/01/25 1530   Provider Notification   Provider Name/Title Dr Guardado   Method of Notification At Bedside   Request Evaluate in Person   Notification Reason SVE;Status Update;Labor Status     MD at bedside for SVE, 0.5/50%/-4. Plan to ascencion pt at 1730, pt will have received 24 hrs of PO Cytotec, and to then place cervidil. Order given via verbal with read back. Will update MD as needed.

## 2025-01-01 NOTE — PLAN OF CARE
Pt arrived to L&D at 1613 as a direct admit for worsening chronic HTN and elevated BPs in clinic. Pt orientated to room, monitors applied with pt consent and POC addressed. Will start PO Cytotec Q2 hrs for cervical ripening and magnesium. Pt aware of plan, all questions and concerns answered. Pt comfortable in room,  to join shortly.    Dr Chance at bedside at 1712 to readdress plan with pt and . Additional orders placed per MD. Will continue to monitor pt and FHT per orders.

## 2025-01-01 NOTE — PLAN OF CARE
Patient progressing in labor. Patient awake for most of shift. Cytotec given per MAR, 6th dose given at 0330.  Continuous EFM in place. [place] in place, ctx 0  LR and Mag infusing. Spouse at bedside, supportive. Labor education on-going.    Maddy Mason RN    Goal Outcome Evaluation:      Plan of Care Reviewed With: patient    Overall Patient Progress: improvingOverall Patient Progress: improving

## 2025-01-02 VITALS
HEIGHT: 61 IN | TEMPERATURE: 98.1 F | OXYGEN SATURATION: 91 % | WEIGHT: 201 LBS | BODY MASS INDEX: 37.95 KG/M2 | SYSTOLIC BLOOD PRESSURE: 104 MMHG | DIASTOLIC BLOOD PRESSURE: 50 MMHG | RESPIRATION RATE: 18 BRPM

## 2025-01-02 LAB
ABO + RH BLD: NORMAL
ALBUMIN SERPL BCG-MCNC: 3.3 G/DL (ref 3.5–5.2)
ALP SERPL-CCNC: 171 U/L (ref 40–150)
ALT SERPL W P-5'-P-CCNC: 17 U/L (ref 0–50)
ANION GAP SERPL CALCULATED.3IONS-SCNC: 14 MMOL/L (ref 7–15)
AST SERPL W P-5'-P-CCNC: 20 U/L (ref 0–45)
BILIRUB SERPL-MCNC: 0.4 MG/DL
BLD GP AB SCN SERPL QL: NEGATIVE
BUN SERPL-MCNC: 6.7 MG/DL (ref 6–20)
CALCIUM SERPL-MCNC: 7.5 MG/DL (ref 8.8–10.4)
CHLORIDE SERPL-SCNC: 97 MMOL/L (ref 98–107)
CREAT SERPL-MCNC: 0.4 MG/DL (ref 0.51–0.95)
EGFRCR SERPLBLD CKD-EPI 2021: >90 ML/MIN/1.73M2
ERYTHROCYTE [DISTWIDTH] IN BLOOD BY AUTOMATED COUNT: 14.4 % (ref 10–15)
GLUCOSE SERPL-MCNC: 109 MG/DL (ref 70–99)
HCO3 SERPL-SCNC: 22 MMOL/L (ref 22–29)
HCT VFR BLD AUTO: 35.6 % (ref 35–47)
HGB BLD-MCNC: 11.9 G/DL (ref 11.7–15.7)
MAGNESIUM SERPL-MCNC: 4.4 MG/DL (ref 1.7–2.3)
MCH RBC QN AUTO: 28.3 PG (ref 26.5–33)
MCHC RBC AUTO-ENTMCNC: 33.4 G/DL (ref 31.5–36.5)
MCV RBC AUTO: 85 FL (ref 78–100)
PLATELET # BLD AUTO: 286 10E3/UL (ref 150–450)
POTASSIUM SERPL-SCNC: 2.8 MMOL/L (ref 3.4–5.3)
PROT SERPL-MCNC: 6.1 G/DL (ref 6.4–8.3)
RBC # BLD AUTO: 4.21 10E6/UL (ref 3.8–5.2)
SODIUM SERPL-SCNC: 133 MMOL/L (ref 135–145)
SPECIMEN EXP DATE BLD: NORMAL
WBC # BLD AUTO: 10.1 10E3/UL (ref 4–11)

## 2025-01-02 PROCEDURE — 84155 ASSAY OF PROTEIN SERUM: CPT | Performed by: STUDENT IN AN ORGANIZED HEALTH CARE EDUCATION/TRAINING PROGRAM

## 2025-01-02 PROCEDURE — 258N000003 HC RX IP 258 OP 636: Performed by: ANESTHESIOLOGY

## 2025-01-02 PROCEDURE — 250N000011 HC RX IP 250 OP 636: Performed by: STUDENT IN AN ORGANIZED HEALTH CARE EDUCATION/TRAINING PROGRAM

## 2025-01-02 PROCEDURE — 258N000003 HC RX IP 258 OP 636: Performed by: OBSTETRICS & GYNECOLOGY

## 2025-01-02 PROCEDURE — 36415 COLL VENOUS BLD VENIPUNCTURE: CPT | Performed by: STUDENT IN AN ORGANIZED HEALTH CARE EDUCATION/TRAINING PROGRAM

## 2025-01-02 PROCEDURE — 86900 BLOOD TYPING SEROLOGIC ABO: CPT | Performed by: STUDENT IN AN ORGANIZED HEALTH CARE EDUCATION/TRAINING PROGRAM

## 2025-01-02 PROCEDURE — 250N000009 HC RX 250: Performed by: STUDENT IN AN ORGANIZED HEALTH CARE EDUCATION/TRAINING PROGRAM

## 2025-01-02 PROCEDURE — 250N000011 HC RX IP 250 OP 636: Performed by: OBSTETRICS & GYNECOLOGY

## 2025-01-02 PROCEDURE — 84450 TRANSFERASE (AST) (SGOT): CPT | Performed by: STUDENT IN AN ORGANIZED HEALTH CARE EDUCATION/TRAINING PROGRAM

## 2025-01-02 PROCEDURE — 83735 ASSAY OF MAGNESIUM: CPT | Performed by: STUDENT IN AN ORGANIZED HEALTH CARE EDUCATION/TRAINING PROGRAM

## 2025-01-02 PROCEDURE — 250N000009 HC RX 250: Mod: JW | Performed by: OBSTETRICS & GYNECOLOGY

## 2025-01-02 PROCEDURE — 250N000013 HC RX MED GY IP 250 OP 250 PS 637: Performed by: OBSTETRICS & GYNECOLOGY

## 2025-01-02 PROCEDURE — 250N000013 HC RX MED GY IP 250 OP 250 PS 637: Performed by: STUDENT IN AN ORGANIZED HEALTH CARE EDUCATION/TRAINING PROGRAM

## 2025-01-02 PROCEDURE — 250N000011 HC RX IP 250 OP 636: Performed by: ANESTHESIOLOGY

## 2025-01-02 PROCEDURE — 250N000009 HC RX 250: Performed by: ANESTHESIOLOGY

## 2025-01-02 PROCEDURE — 3E033VJ INTRODUCTION OF OTHER HORMONE INTO PERIPHERAL VEIN, PERCUTANEOUS APPROACH: ICD-10-PCS | Performed by: STUDENT IN AN ORGANIZED HEALTH CARE EDUCATION/TRAINING PROGRAM

## 2025-01-02 PROCEDURE — 85041 AUTOMATED RBC COUNT: CPT | Performed by: STUDENT IN AN ORGANIZED HEALTH CARE EDUCATION/TRAINING PROGRAM

## 2025-01-02 PROCEDURE — 86901 BLOOD TYPING SEROLOGIC RH(D): CPT | Performed by: STUDENT IN AN ORGANIZED HEALTH CARE EDUCATION/TRAINING PROGRAM

## 2025-01-02 PROCEDURE — 85014 HEMATOCRIT: CPT | Performed by: STUDENT IN AN ORGANIZED HEALTH CARE EDUCATION/TRAINING PROGRAM

## 2025-01-02 PROCEDURE — 120N000001 HC R&B MED SURG/OB

## 2025-01-02 RX ORDER — CEFAZOLIN SODIUM/WATER 2 G/20 ML
2 SYRINGE (ML) INTRAVENOUS SEE ADMIN INSTRUCTIONS
Status: DISCONTINUED | OUTPATIENT
Start: 2025-01-02 | End: 2025-01-03 | Stop reason: HOSPADM

## 2025-01-02 RX ORDER — NALBUPHINE HYDROCHLORIDE 10 MG/ML
2.5-5 INJECTION INTRAMUSCULAR; INTRAVENOUS; SUBCUTANEOUS EVERY 6 HOURS PRN
Status: DISCONTINUED | OUTPATIENT
Start: 2025-01-02 | End: 2025-01-05 | Stop reason: HOSPADM

## 2025-01-02 RX ORDER — NALOXONE HYDROCHLORIDE 0.4 MG/ML
0.4 INJECTION, SOLUTION INTRAMUSCULAR; INTRAVENOUS; SUBCUTANEOUS
Status: DISCONTINUED | OUTPATIENT
Start: 2025-01-02 | End: 2025-01-05 | Stop reason: HOSPADM

## 2025-01-02 RX ORDER — LOPERAMIDE HYDROCHLORIDE 2 MG/1
2 CAPSULE ORAL
Status: DISCONTINUED | OUTPATIENT
Start: 2025-01-02 | End: 2025-01-03 | Stop reason: HOSPADM

## 2025-01-02 RX ORDER — LOPERAMIDE HYDROCHLORIDE 2 MG/1
4 CAPSULE ORAL
Status: DISCONTINUED | OUTPATIENT
Start: 2025-01-02 | End: 2025-01-03 | Stop reason: HOSPADM

## 2025-01-02 RX ORDER — NALOXONE HYDROCHLORIDE 0.4 MG/ML
0.2 INJECTION, SOLUTION INTRAMUSCULAR; INTRAVENOUS; SUBCUTANEOUS
Status: DISCONTINUED | OUTPATIENT
Start: 2025-01-02 | End: 2025-01-05 | Stop reason: HOSPADM

## 2025-01-02 RX ORDER — TERBUTALINE SULFATE 1 MG/ML
0.25 INJECTION, SOLUTION SUBCUTANEOUS
Status: DISCONTINUED | OUTPATIENT
Start: 2025-01-02 | End: 2025-01-03 | Stop reason: HOSPADM

## 2025-01-02 RX ORDER — EPHEDRINE SULFATE 50 MG/ML
INJECTION, SOLUTION INTRAMUSCULAR; INTRAVENOUS; SUBCUTANEOUS
Status: COMPLETED
Start: 2025-01-02 | End: 2025-01-02

## 2025-01-02 RX ORDER — LIDOCAINE 40 MG/G
CREAM TOPICAL
Status: DISCONTINUED | OUTPATIENT
Start: 2025-01-02 | End: 2025-01-03 | Stop reason: HOSPADM

## 2025-01-02 RX ORDER — OXYTOCIN/0.9 % SODIUM CHLORIDE 30/500 ML
340 PLASTIC BAG, INJECTION (ML) INTRAVENOUS CONTINUOUS PRN
Status: DISCONTINUED | OUTPATIENT
Start: 2025-01-02 | End: 2025-01-03 | Stop reason: HOSPADM

## 2025-01-02 RX ORDER — TRANEXAMIC ACID 10 MG/ML
1 INJECTION, SOLUTION INTRAVENOUS EVERY 30 MIN PRN
Status: DISCONTINUED | OUTPATIENT
Start: 2025-01-02 | End: 2025-01-03 | Stop reason: HOSPADM

## 2025-01-02 RX ORDER — ROPIVACAINE HYDROCHLORIDE 2 MG/ML
10 INJECTION, SOLUTION EPIDURAL; INFILTRATION; PERINEURAL ONCE
Status: DISCONTINUED | OUTPATIENT
Start: 2025-01-02 | End: 2025-01-03 | Stop reason: HOSPADM

## 2025-01-02 RX ORDER — ACETAMINOPHEN 325 MG/1
975 TABLET ORAL ONCE
Status: COMPLETED | OUTPATIENT
Start: 2025-01-02 | End: 2025-01-03

## 2025-01-02 RX ORDER — FLUCONAZOLE 150 MG/1
150 TABLET ORAL ONCE
Status: COMPLETED | OUTPATIENT
Start: 2025-01-02 | End: 2025-01-02

## 2025-01-02 RX ORDER — FENTANYL CITRATE-0.9 % NACL/PF 10 MCG/ML
100 PLASTIC BAG, INJECTION (ML) INTRAVENOUS
Status: DISCONTINUED | OUTPATIENT
Start: 2025-01-02 | End: 2025-01-03

## 2025-01-02 RX ORDER — SODIUM CHLORIDE, SODIUM LACTATE, POTASSIUM CHLORIDE, CALCIUM CHLORIDE 600; 310; 30; 20 MG/100ML; MG/100ML; MG/100ML; MG/100ML
INJECTION, SOLUTION INTRAVENOUS CONTINUOUS PRN
Status: DISCONTINUED | OUTPATIENT
Start: 2025-01-02 | End: 2025-01-03 | Stop reason: HOSPADM

## 2025-01-02 RX ORDER — FENTANYL CITRATE-0.9 % NACL/PF 10 MCG/ML
100 PLASTIC BAG, INJECTION (ML) INTRAVENOUS EVERY 5 MIN PRN
Status: COMPLETED | OUTPATIENT
Start: 2025-01-02 | End: 2025-01-02

## 2025-01-02 RX ORDER — CITRIC ACID/SODIUM CITRATE 334-500MG
30 SOLUTION, ORAL ORAL
Status: COMPLETED | OUTPATIENT
Start: 2025-01-02 | End: 2025-01-03

## 2025-01-02 RX ORDER — EPHEDRINE SULFATE 50 MG/ML
10 INJECTION, SOLUTION INTRAMUSCULAR; INTRAVENOUS; SUBCUTANEOUS ONCE
Status: COMPLETED | OUTPATIENT
Start: 2025-01-02 | End: 2025-01-02

## 2025-01-02 RX ORDER — OXYTOCIN/0.9 % SODIUM CHLORIDE 30/500 ML
1-24 PLASTIC BAG, INJECTION (ML) INTRAVENOUS CONTINUOUS
Status: DISCONTINUED | OUTPATIENT
Start: 2025-01-02 | End: 2025-01-03 | Stop reason: HOSPADM

## 2025-01-02 RX ORDER — METHYLERGONOVINE MALEATE 0.2 MG/ML
200 INJECTION INTRAVENOUS
Status: DISCONTINUED | OUTPATIENT
Start: 2025-01-02 | End: 2025-01-03 | Stop reason: HOSPADM

## 2025-01-02 RX ORDER — SODIUM CHLORIDE, SODIUM LACTATE, POTASSIUM CHLORIDE, CALCIUM CHLORIDE 600; 310; 30; 20 MG/100ML; MG/100ML; MG/100ML; MG/100ML
INJECTION, SOLUTION INTRAVENOUS CONTINUOUS
Status: DISCONTINUED | OUTPATIENT
Start: 2025-01-02 | End: 2025-01-03 | Stop reason: HOSPADM

## 2025-01-02 RX ORDER — MISOPROSTOL 200 UG/1
400 TABLET ORAL
Status: DISCONTINUED | OUTPATIENT
Start: 2025-01-02 | End: 2025-01-03 | Stop reason: HOSPADM

## 2025-01-02 RX ORDER — FENTANYL CITRATE 50 UG/ML
100 INJECTION, SOLUTION INTRAMUSCULAR; INTRAVENOUS
Status: DISCONTINUED | OUTPATIENT
Start: 2025-01-02 | End: 2025-01-05 | Stop reason: HOSPADM

## 2025-01-02 RX ORDER — OXYTOCIN 10 [USP'U]/ML
10 INJECTION, SOLUTION INTRAMUSCULAR; INTRAVENOUS
Status: DISCONTINUED | OUTPATIENT
Start: 2025-01-02 | End: 2025-01-03 | Stop reason: HOSPADM

## 2025-01-02 RX ORDER — CEFAZOLIN SODIUM/WATER 2 G/20 ML
2 SYRINGE (ML) INTRAVENOUS
Status: COMPLETED | OUTPATIENT
Start: 2025-01-02 | End: 2025-01-03

## 2025-01-02 RX ORDER — CARBOPROST TROMETHAMINE 250 UG/ML
250 INJECTION, SOLUTION INTRAMUSCULAR
Status: DISCONTINUED | OUTPATIENT
Start: 2025-01-02 | End: 2025-01-03 | Stop reason: HOSPADM

## 2025-01-02 RX ORDER — MISOPROSTOL 200 UG/1
800 TABLET ORAL
Status: DISCONTINUED | OUTPATIENT
Start: 2025-01-02 | End: 2025-01-03 | Stop reason: HOSPADM

## 2025-01-02 RX ADMIN — FLUCONAZOLE 150 MG: 150 TABLET ORAL at 13:07

## 2025-01-02 RX ADMIN — EPHEDRINE SULFATE 10 MG: 5 INJECTION INTRAVENOUS at 15:51

## 2025-01-02 RX ADMIN — MAGNESIUM SULFATE IN WATER 2 G/HR: 20 INJECTION, SOLUTION INTRAVENOUS at 19:09

## 2025-01-02 RX ADMIN — Medication 100 MCG: at 15:00

## 2025-01-02 RX ADMIN — Medication: at 14:57

## 2025-01-02 RX ADMIN — Medication 100 MCG: at 15:10

## 2025-01-02 RX ADMIN — SODIUM CHLORIDE, POTASSIUM CHLORIDE, SODIUM LACTATE AND CALCIUM CHLORIDE: 600; 310; 30; 20 INJECTION, SOLUTION INTRAVENOUS at 15:58

## 2025-01-02 RX ADMIN — PANTOPRAZOLE SODIUM 40 MG: 40 TABLET, DELAYED RELEASE ORAL at 08:43

## 2025-01-02 RX ADMIN — FENTANYL CITRATE 100 MCG: 50 INJECTION, SOLUTION INTRAMUSCULAR; INTRAVENOUS at 13:54

## 2025-01-02 RX ADMIN — FENTANYL CITRATE 100 MCG: 50 INJECTION, SOLUTION INTRAMUSCULAR; INTRAVENOUS at 12:47

## 2025-01-02 RX ADMIN — NIFEDIPINE 60 MG: 30 TABLET, FILM COATED, EXTENDED RELEASE ORAL at 08:42

## 2025-01-02 RX ADMIN — DEXTROAMPHETAMINE SACCHARATE, AMPHETAMINE ASPARTATE MONOHYDRATE, DEXTROAMPHETAMINE SULFATE, AND AMPHETAMINE SULFATE 30 MG: 5; 5; 5; 5 CAPSULE ORAL at 08:43

## 2025-01-02 RX ADMIN — EPHEDRINE SULFATE 25 MG: 50 INJECTION, SOLUTION INTRAMUSCULAR; INTRAVENOUS; SUBCUTANEOUS at 15:32

## 2025-01-02 RX ADMIN — Medication 100 MCG: at 14:58

## 2025-01-02 RX ADMIN — EPHEDRINE SULFATE 25 MG: 5 INJECTION INTRAVENOUS at 15:32

## 2025-01-02 RX ADMIN — ACETAMINOPHEN 650 MG: 325 TABLET, FILM COATED ORAL at 01:42

## 2025-01-02 RX ADMIN — Medication 2 MILLI-UNITS/MIN: at 08:50

## 2025-01-02 RX ADMIN — SODIUM CHLORIDE, POTASSIUM CHLORIDE, SODIUM LACTATE AND CALCIUM CHLORIDE 500 ML: 600; 310; 30; 20 INJECTION, SOLUTION INTRAVENOUS at 13:50

## 2025-01-02 RX ADMIN — Medication: at 23:16

## 2025-01-02 RX ADMIN — LEVOTHYROXINE SODIUM 100 MCG: 100 TABLET ORAL at 07:22

## 2025-01-02 RX ADMIN — MAGNESIUM SULFATE IN WATER 2 G/HR: 20 INJECTION, SOLUTION INTRAVENOUS at 08:58

## 2025-01-02 RX ADMIN — DOCUSATE SODIUM 100 MG: 100 CAPSULE, LIQUID FILLED ORAL at 08:42

## 2025-01-02 RX ADMIN — HYDRALAZINE HYDROCHLORIDE 10 MG: 10 TABLET ORAL at 07:22

## 2025-01-02 RX ADMIN — Medication 100 MCG: at 15:06

## 2025-01-02 RX ADMIN — HYDRALAZINE HYDROCHLORIDE 10 MG: 10 TABLET ORAL at 13:07

## 2025-01-02 ASSESSMENT — ACTIVITIES OF DAILY LIVING (ADL)
ADLS_ACUITY_SCORE: 23
ADLS_ACUITY_SCORE: 25
ADLS_ACUITY_SCORE: 27
ADLS_ACUITY_SCORE: 23
ADLS_ACUITY_SCORE: 25
ADLS_ACUITY_SCORE: 23
ADLS_ACUITY_SCORE: 25
ADLS_ACUITY_SCORE: 23
ADLS_ACUITY_SCORE: 27
ADLS_ACUITY_SCORE: 23
ADLS_ACUITY_SCORE: 23
ADLS_ACUITY_SCORE: 25
ADLS_ACUITY_SCORE: 27
ADLS_ACUITY_SCORE: 25
ADLS_ACUITY_SCORE: 27
ADLS_ACUITY_SCORE: 27
ADLS_ACUITY_SCORE: 25
ADLS_ACUITY_SCORE: 27

## 2025-01-02 NOTE — PROVIDER NOTIFICATION
01/01/25 1740   Provider Notification   Provider Name/Title Dr Guardado   Method of Notification Electronic Page   Request Evaluate - Remote   Notification Reason SVE;Status Update     MD updated on SVE post Cytotec and Cervidil insertion. Pt requesting something for pain. Okay to give Morphine 10mg and Vistaril 50mg for sleep/pain. Order given telephone with read back.    VSS during shift. Mag infusing at 2g/hr and LR 10ml/hr. Denying S/S of PreE. Up to the bathroom frequently, I&Os recorded. Morphine and Vistaril given for rest/pain relief. Will continue to monitor per orders.

## 2025-01-02 NOTE — PLAN OF CARE
Patient progressing in labor. Patient awake for most of shift.  Continuous EFM in place. IV infusing LR and Mag. Plan of care is to have next provider reevaluate next induction method. Spouse at bedside, supportive. Labor education on-going.    Maddy Mason RN    Goal Outcome Evaluation:      Plan of Care Reviewed With: patient    Overall Patient Progress: improvingOverall Patient Progress: improving

## 2025-01-02 NOTE — PROGRESS NOTES
"Pt has felt a couple of cramps but still very comfortable, no pelvic pressure or contractions.    O:  BP (!) 157/80   Temp 97.2  F (36.2  C) (Temporal)   Resp 16   Ht 1.549 m (5' 1\")   Wt 91.2 kg (201 lb)   LMP 04/26/2024   SpO2 98%   BMI 37.98 kg/m     SVE: ft/th/hi  Unable to place cook catheter by palpation. Pt placed in dorsal lithotomy position, a speculum was placed within the vagina. Vaginal tissue erythematous with curd-like white discharge c/w yeast infection. The anterior lip of the cervix was grasped with a ring forcep and the cook catheter was able to be placed through the cervix. Intrauterine and vaginal balloons inflated with 60cc of sterile saline    Diflucan 150mg PO x1 ordered for vaginal candidiasis  Continue low dose pitocin at 6milli/unit(s) while cook catheter in place  Pain mgmt per pt request  Continue magnesium. Decreased reflexes so repeat PIH labs and mag level ordered    Dispo: continue IOL    Myesha Tellez MD  01/02/25 12:51 PM   "

## 2025-01-02 NOTE — PROGRESS NOTES
Dr. Tellez updated with patients status. Patient is now comfortable with an epidural. Continue POC.

## 2025-01-02 NOTE — PROGRESS NOTES
"Intrapartum progress note:    S: Vianey is doing well, no cramping or contractions,no pelvic pressure. She feels groggy on the magnesium but is feeling better with her blood pressures improved. Denies HA, VC, CP, SOB, RUQ pain.    O  /79   Temp 97.1  F (36.2  C) (Temporal)   Resp 16   Ht 1.549 m (5' 1\")   Wt 91.2 kg (201 lb)   LMP 2024   SpO2 98%   BMI 37.98 kg/m     SVE: //hi, unable to get completely through internal os    A/P:  Vianey Schmidt is a 33yo  at 35w6d who is undergoing IOL due to CHTN with superimposed preeclampsia with severe features    IOL  - s/p cytotec PO x 24hr, and cervidil over night  - attempted placement of cook catheter, unable to place due to lack of cervical dilation  - plan to start low dose pitocin and reattempt cook catheter placement. Disc if unable to place or no cervical change with cook, would recommend  for failed IOL. However mom and baby are currently both doing well so no urgency  - TAUS performed and confirmed cephalic position  - pain mgmt per pt request  - s/p BMS at 32wks  - GBS negative    CHTN with superimposed preeclampsia with severe features  - procardia 60mg BID and hydralazine 10mg QID ordered  - pt with significant SOB with labetalol, plan to avoid  - on magnesium for seizure ppx, plan to continue for 24hr pp    Hypothyroid, cont home med  ADHD, cont home med    Dispo: continue IOL    Myesha Tellez MD  [facility] CARISA Jurado  25 8:34 AM   "

## 2025-01-02 NOTE — PROVIDER NOTIFICATION
01/02/25 0554   Provider Notification   Provider Name/Title Dr. Guardado   Method of Notification Phone   Request Evaluate-Remote   Notification Reason Other     Dr. Guardado wants patient to rest, and will have next provider reevaluate plan of care.

## 2025-01-02 NOTE — PROGRESS NOTES
Reflexes noted to be diminished. Dr. Tellez paged regarding reflexes.  Per Dr. Tellez PIH labs and Mag level ordered. Continue to monitor.

## 2025-01-03 LAB — HGB BLD-MCNC: 9.6 G/DL (ref 11.7–15.7)

## 2025-01-03 PROCEDURE — 250N000009 HC RX 250: Performed by: OBSTETRICS & GYNECOLOGY

## 2025-01-03 PROCEDURE — 250N000013 HC RX MED GY IP 250 OP 250 PS 637: Performed by: OBSTETRICS & GYNECOLOGY

## 2025-01-03 PROCEDURE — 272N000001 HC OR GENERAL SUPPLY STERILE: Performed by: STUDENT IN AN ORGANIZED HEALTH CARE EDUCATION/TRAINING PROGRAM

## 2025-01-03 PROCEDURE — 999N000016 HC STATISTIC ATTENDANCE AT DELIVERY

## 2025-01-03 PROCEDURE — 710N000009 HC RECOVERY PHASE 1, LEVEL 1, PER MIN: Performed by: STUDENT IN AN ORGANIZED HEALTH CARE EDUCATION/TRAINING PROGRAM

## 2025-01-03 PROCEDURE — 258N000003 HC RX IP 258 OP 636: Performed by: STUDENT IN AN ORGANIZED HEALTH CARE EDUCATION/TRAINING PROGRAM

## 2025-01-03 PROCEDURE — 258N000003 HC RX IP 258 OP 636: Performed by: OBSTETRICS & GYNECOLOGY

## 2025-01-03 PROCEDURE — 36415 COLL VENOUS BLD VENIPUNCTURE: CPT | Performed by: STUDENT IN AN ORGANIZED HEALTH CARE EDUCATION/TRAINING PROGRAM

## 2025-01-03 PROCEDURE — 120N000012 HC R&B POSTPARTUM

## 2025-01-03 PROCEDURE — 250N000013 HC RX MED GY IP 250 OP 250 PS 637: Performed by: STUDENT IN AN ORGANIZED HEALTH CARE EDUCATION/TRAINING PROGRAM

## 2025-01-03 PROCEDURE — 250N000011 HC RX IP 250 OP 636: Performed by: STUDENT IN AN ORGANIZED HEALTH CARE EDUCATION/TRAINING PROGRAM

## 2025-01-03 PROCEDURE — 360N000076 HC SURGERY LEVEL 3, PER MIN: Performed by: STUDENT IN AN ORGANIZED HEALTH CARE EDUCATION/TRAINING PROGRAM

## 2025-01-03 PROCEDURE — 250N000011 HC RX IP 250 OP 636: Performed by: OBSTETRICS & GYNECOLOGY

## 2025-01-03 PROCEDURE — 85018 HEMOGLOBIN: CPT | Performed by: STUDENT IN AN ORGANIZED HEALTH CARE EDUCATION/TRAINING PROGRAM

## 2025-01-03 PROCEDURE — 370N000017 HC ANESTHESIA TECHNICAL FEE, PER MIN: Performed by: STUDENT IN AN ORGANIZED HEALTH CARE EDUCATION/TRAINING PROGRAM

## 2025-01-03 PROCEDURE — 88307 TISSUE EXAM BY PATHOLOGIST: CPT | Mod: TC | Performed by: STUDENT IN AN ORGANIZED HEALTH CARE EDUCATION/TRAINING PROGRAM

## 2025-01-03 RX ORDER — FLUTICASONE PROPIONATE 50 MCG
1 SPRAY, SUSPENSION (ML) NASAL DAILY
Status: DISCONTINUED | OUTPATIENT
Start: 2025-01-03 | End: 2025-01-05 | Stop reason: HOSPADM

## 2025-01-03 RX ORDER — CETIRIZINE HYDROCHLORIDE 5 MG/1
5 TABLET ORAL DAILY
Status: DISCONTINUED | OUTPATIENT
Start: 2025-01-03 | End: 2025-01-05 | Stop reason: HOSPADM

## 2025-01-03 RX ORDER — LOPERAMIDE HYDROCHLORIDE 2 MG/1
4 CAPSULE ORAL
Status: DISCONTINUED | OUTPATIENT
Start: 2025-01-03 | End: 2025-01-05 | Stop reason: HOSPADM

## 2025-01-03 RX ORDER — OXYTOCIN/0.9 % SODIUM CHLORIDE 30/500 ML
340 PLASTIC BAG, INJECTION (ML) INTRAVENOUS CONTINUOUS PRN
Status: DISCONTINUED | OUTPATIENT
Start: 2025-01-03 | End: 2025-01-05 | Stop reason: HOSPADM

## 2025-01-03 RX ORDER — MISOPROSTOL 200 UG/1
400 TABLET ORAL
Status: DISCONTINUED | OUTPATIENT
Start: 2025-01-03 | End: 2025-01-05 | Stop reason: HOSPADM

## 2025-01-03 RX ORDER — OXYTOCIN 10 [USP'U]/ML
10 INJECTION, SOLUTION INTRAMUSCULAR; INTRAVENOUS
Status: DISCONTINUED | OUTPATIENT
Start: 2025-01-03 | End: 2025-01-05 | Stop reason: HOSPADM

## 2025-01-03 RX ORDER — METOCLOPRAMIDE 10 MG/1
10 TABLET ORAL EVERY 6 HOURS PRN
Status: DISCONTINUED | OUTPATIENT
Start: 2025-01-03 | End: 2025-01-03 | Stop reason: HOSPADM

## 2025-01-03 RX ORDER — METOCLOPRAMIDE HYDROCHLORIDE 5 MG/ML
10 INJECTION INTRAMUSCULAR; INTRAVENOUS EVERY 6 HOURS PRN
Status: DISCONTINUED | OUTPATIENT
Start: 2025-01-03 | End: 2025-01-05 | Stop reason: HOSPADM

## 2025-01-03 RX ORDER — SIMETHICONE 80 MG
80 TABLET,CHEWABLE ORAL 4 TIMES DAILY PRN
Status: DISCONTINUED | OUTPATIENT
Start: 2025-01-03 | End: 2025-01-05 | Stop reason: HOSPADM

## 2025-01-03 RX ORDER — HYDROCORTISONE 25 MG/G
CREAM TOPICAL 3 TIMES DAILY PRN
Status: DISCONTINUED | OUTPATIENT
Start: 2025-01-03 | End: 2025-01-05 | Stop reason: HOSPADM

## 2025-01-03 RX ORDER — SODIUM PHOSPHATE,MONO-DIBASIC 19G-7G/118
1 ENEMA (ML) RECTAL DAILY PRN
Status: DISCONTINUED | OUTPATIENT
Start: 2025-01-05 | End: 2025-01-05 | Stop reason: HOSPADM

## 2025-01-03 RX ORDER — CEFAZOLIN SODIUM/WATER 2 G/20 ML
SYRINGE (ML) INTRAVENOUS
Status: DISCONTINUED
Start: 2025-01-03 | End: 2025-01-03 | Stop reason: HOSPADM

## 2025-01-03 RX ORDER — NALBUPHINE HYDROCHLORIDE 10 MG/ML
2.5-5 INJECTION INTRAMUSCULAR; INTRAVENOUS; SUBCUTANEOUS EVERY 6 HOURS PRN
Status: DISCONTINUED | OUTPATIENT
Start: 2025-01-03 | End: 2025-01-05 | Stop reason: HOSPADM

## 2025-01-03 RX ORDER — ACETAMINOPHEN 325 MG/1
975 TABLET ORAL EVERY 6 HOURS
Status: DISCONTINUED | OUTPATIENT
Start: 2025-01-03 | End: 2025-01-05 | Stop reason: HOSPADM

## 2025-01-03 RX ORDER — BISACODYL 10 MG
10 SUPPOSITORY, RECTAL RECTAL DAILY PRN
Status: DISCONTINUED | OUTPATIENT
Start: 2025-01-05 | End: 2025-01-05 | Stop reason: HOSPADM

## 2025-01-03 RX ORDER — ONDANSETRON 2 MG/ML
4 INJECTION INTRAMUSCULAR; INTRAVENOUS EVERY 6 HOURS PRN
Status: DISCONTINUED | OUTPATIENT
Start: 2025-01-03 | End: 2025-01-05 | Stop reason: HOSPADM

## 2025-01-03 RX ORDER — ONDANSETRON 4 MG/1
4 TABLET, ORALLY DISINTEGRATING ORAL EVERY 6 HOURS PRN
Status: DISCONTINUED | OUTPATIENT
Start: 2025-01-03 | End: 2025-01-05 | Stop reason: HOSPADM

## 2025-01-03 RX ORDER — LOPERAMIDE HYDROCHLORIDE 2 MG/1
2 CAPSULE ORAL
Status: DISCONTINUED | OUTPATIENT
Start: 2025-01-03 | End: 2025-01-05 | Stop reason: HOSPADM

## 2025-01-03 RX ORDER — LIDOCAINE 40 MG/G
CREAM TOPICAL
Status: DISCONTINUED | OUTPATIENT
Start: 2025-01-03 | End: 2025-01-03

## 2025-01-03 RX ORDER — EPHEDRINE SULFATE 50 MG/ML
5 INJECTION, SOLUTION INTRAMUSCULAR; INTRAVENOUS; SUBCUTANEOUS
Status: DISCONTINUED | OUTPATIENT
Start: 2025-01-03 | End: 2025-01-03 | Stop reason: HOSPADM

## 2025-01-03 RX ORDER — CITRIC ACID/SODIUM CITRATE 334-500MG
SOLUTION, ORAL ORAL
Status: COMPLETED
Start: 2025-01-03 | End: 2025-01-03

## 2025-01-03 RX ORDER — MODIFIED LANOLIN
OINTMENT (GRAM) TOPICAL
Status: DISCONTINUED | OUTPATIENT
Start: 2025-01-03 | End: 2025-01-05 | Stop reason: HOSPADM

## 2025-01-03 RX ORDER — PROCHLORPERAZINE MALEATE 5 MG/1
10 TABLET ORAL EVERY 6 HOURS PRN
Status: DISCONTINUED | OUTPATIENT
Start: 2025-01-03 | End: 2025-01-03 | Stop reason: HOSPADM

## 2025-01-03 RX ORDER — OXYTOCIN/0.9 % SODIUM CHLORIDE 30/500 ML
75 PLASTIC BAG, INJECTION (ML) INTRAVENOUS CONTINUOUS
Status: DISCONTINUED | OUTPATIENT
Start: 2025-01-03 | End: 2025-01-05 | Stop reason: HOSPADM

## 2025-01-03 RX ORDER — IBUPROFEN 400 MG/1
800 TABLET, FILM COATED ORAL EVERY 6 HOURS
Status: DISCONTINUED | OUTPATIENT
Start: 2025-01-04 | End: 2025-01-05 | Stop reason: HOSPADM

## 2025-01-03 RX ORDER — OXYTOCIN/0.9 % SODIUM CHLORIDE 30/500 ML
100-340 PLASTIC BAG, INJECTION (ML) INTRAVENOUS CONTINUOUS PRN
Status: DISCONTINUED | OUTPATIENT
Start: 2025-01-03 | End: 2025-01-05 | Stop reason: HOSPADM

## 2025-01-03 RX ORDER — LOPERAMIDE HYDROCHLORIDE 2 MG/1
2 CAPSULE ORAL 4 TIMES DAILY PRN
Status: DISCONTINUED | OUTPATIENT
Start: 2025-01-03 | End: 2025-01-05 | Stop reason: HOSPADM

## 2025-01-03 RX ORDER — METOCLOPRAMIDE HYDROCHLORIDE 5 MG/ML
10 INJECTION INTRAMUSCULAR; INTRAVENOUS EVERY 6 HOURS PRN
Status: DISCONTINUED | OUTPATIENT
Start: 2025-01-03 | End: 2025-01-03 | Stop reason: HOSPADM

## 2025-01-03 RX ORDER — OXYTOCIN/0.9 % SODIUM CHLORIDE 30/500 ML
PLASTIC BAG, INJECTION (ML) INTRAVENOUS
Status: COMPLETED
Start: 2025-01-03 | End: 2025-01-03

## 2025-01-03 RX ORDER — KETOROLAC TROMETHAMINE 30 MG/ML
30 INJECTION, SOLUTION INTRAMUSCULAR; INTRAVENOUS EVERY 6 HOURS
Status: COMPLETED | OUTPATIENT
Start: 2025-01-03 | End: 2025-01-04

## 2025-01-03 RX ORDER — ONDANSETRON 4 MG/1
4 TABLET, ORALLY DISINTEGRATING ORAL EVERY 6 HOURS PRN
Status: DISCONTINUED | OUTPATIENT
Start: 2025-01-03 | End: 2025-01-03 | Stop reason: HOSPADM

## 2025-01-03 RX ORDER — MISOPROSTOL 200 UG/1
800 TABLET ORAL
Status: DISCONTINUED | OUTPATIENT
Start: 2025-01-03 | End: 2025-01-05 | Stop reason: HOSPADM

## 2025-01-03 RX ORDER — DEXTROSE, SODIUM CHLORIDE, SODIUM LACTATE, POTASSIUM CHLORIDE, AND CALCIUM CHLORIDE 5; .6; .31; .03; .02 G/100ML; G/100ML; G/100ML; G/100ML; G/100ML
INJECTION, SOLUTION INTRAVENOUS CONTINUOUS
Status: DISCONTINUED | OUTPATIENT
Start: 2025-01-03 | End: 2025-01-05

## 2025-01-03 RX ORDER — METHYLERGONOVINE MALEATE 0.2 MG/ML
200 INJECTION INTRAVENOUS
Status: DISCONTINUED | OUTPATIENT
Start: 2025-01-03 | End: 2025-01-05 | Stop reason: HOSPADM

## 2025-01-03 RX ORDER — CARBOPROST TROMETHAMINE 250 UG/ML
250 INJECTION, SOLUTION INTRAMUSCULAR
Status: DISCONTINUED | OUTPATIENT
Start: 2025-01-03 | End: 2025-01-05 | Stop reason: HOSPADM

## 2025-01-03 RX ORDER — KETOROLAC TROMETHAMINE 30 MG/ML
30 INJECTION, SOLUTION INTRAMUSCULAR; INTRAVENOUS ONCE
Status: COMPLETED | OUTPATIENT
Start: 2025-01-03 | End: 2025-01-03

## 2025-01-03 RX ORDER — OXYCODONE HYDROCHLORIDE 5 MG/1
5 TABLET ORAL EVERY 4 HOURS PRN
Status: DISCONTINUED | OUTPATIENT
Start: 2025-01-03 | End: 2025-01-05 | Stop reason: HOSPADM

## 2025-01-03 RX ORDER — AMOXICILLIN 250 MG
1 CAPSULE ORAL 2 TIMES DAILY
Status: DISCONTINUED | OUTPATIENT
Start: 2025-01-03 | End: 2025-01-05 | Stop reason: HOSPADM

## 2025-01-03 RX ORDER — METOCLOPRAMIDE 10 MG/1
10 TABLET ORAL EVERY 6 HOURS PRN
Status: DISCONTINUED | OUTPATIENT
Start: 2025-01-03 | End: 2025-01-05 | Stop reason: HOSPADM

## 2025-01-03 RX ORDER — TRANEXAMIC ACID 10 MG/ML
1 INJECTION, SOLUTION INTRAVENOUS EVERY 30 MIN PRN
Status: DISCONTINUED | OUTPATIENT
Start: 2025-01-03 | End: 2025-01-05 | Stop reason: HOSPADM

## 2025-01-03 RX ORDER — SODIUM CHLORIDE, SODIUM LACTATE, POTASSIUM CHLORIDE, CALCIUM CHLORIDE 600; 310; 30; 20 MG/100ML; MG/100ML; MG/100ML; MG/100ML
INJECTION, SOLUTION INTRAVENOUS CONTINUOUS
Status: DISCONTINUED | OUTPATIENT
Start: 2025-01-03 | End: 2025-01-05

## 2025-01-03 RX ORDER — PROCHLORPERAZINE MALEATE 10 MG
10 TABLET ORAL EVERY 6 HOURS PRN
Status: DISCONTINUED | OUTPATIENT
Start: 2025-01-03 | End: 2025-01-05 | Stop reason: HOSPADM

## 2025-01-03 RX ORDER — KETOROLAC TROMETHAMINE 30 MG/ML
INJECTION, SOLUTION INTRAMUSCULAR; INTRAVENOUS
Status: COMPLETED
Start: 2025-01-03 | End: 2025-01-03

## 2025-01-03 RX ORDER — ONDANSETRON 2 MG/ML
4 INJECTION INTRAMUSCULAR; INTRAVENOUS EVERY 6 HOURS PRN
Status: DISCONTINUED | OUTPATIENT
Start: 2025-01-03 | End: 2025-01-03 | Stop reason: HOSPADM

## 2025-01-03 RX ORDER — AMOXICILLIN 250 MG
2 CAPSULE ORAL 2 TIMES DAILY
Status: DISCONTINUED | OUTPATIENT
Start: 2025-01-03 | End: 2025-01-05 | Stop reason: HOSPADM

## 2025-01-03 RX ORDER — NIFEDIPINE 30 MG/1
60 TABLET, EXTENDED RELEASE ORAL EVERY 12 HOURS
Status: DISCONTINUED | OUTPATIENT
Start: 2025-01-03 | End: 2025-01-05 | Stop reason: HOSPADM

## 2025-01-03 RX ADMIN — KETOROLAC TROMETHAMINE 30 MG: 30 INJECTION, SOLUTION INTRAMUSCULAR at 06:55

## 2025-01-03 RX ADMIN — MAGNESIUM SULFATE IN WATER 2 G/HR: 20 INJECTION, SOLUTION INTRAVENOUS at 05:42

## 2025-01-03 RX ADMIN — KETOROLAC TROMETHAMINE 30 MG: 30 INJECTION, SOLUTION INTRAMUSCULAR at 19:26

## 2025-01-03 RX ADMIN — SODIUM CITRATE AND CITRIC ACID MONOHYDRATE 30 ML: 500; 334 SOLUTION ORAL at 03:44

## 2025-01-03 RX ADMIN — Medication 30 ML: at 03:44

## 2025-01-03 RX ADMIN — KETOROLAC TROMETHAMINE 30 MG: 30 INJECTION, SOLUTION INTRAMUSCULAR at 12:51

## 2025-01-03 RX ADMIN — DOXYLAMINE SUCCINATE 25 MG: 25 TABLET ORAL at 20:41

## 2025-01-03 RX ADMIN — DOCUSATE SODIUM 100 MG: 100 CAPSULE, LIQUID FILLED ORAL at 20:40

## 2025-01-03 RX ADMIN — Medication 75 ML/HR: at 06:38

## 2025-01-03 RX ADMIN — ACETAMINOPHEN 975 MG: 325 TABLET, FILM COATED ORAL at 09:33

## 2025-01-03 RX ADMIN — MAGNESIUM SULFATE IN WATER 2 G/HR: 20 INJECTION, SOLUTION INTRAVENOUS at 16:45

## 2025-01-03 RX ADMIN — SENNOSIDES AND DOCUSATE SODIUM 1 TABLET: 50; 8.6 TABLET ORAL at 08:15

## 2025-01-03 RX ADMIN — SODIUM CHLORIDE, POTASSIUM CHLORIDE, SODIUM LACTATE AND CALCIUM CHLORIDE: 600; 310; 30; 20 INJECTION, SOLUTION INTRAVENOUS at 12:50

## 2025-01-03 RX ADMIN — LEVOTHYROXINE SODIUM 100 MCG: 100 TABLET ORAL at 08:14

## 2025-01-03 RX ADMIN — SENNOSIDES AND DOCUSATE SODIUM 1 TABLET: 50; 8.6 TABLET ORAL at 20:40

## 2025-01-03 RX ADMIN — ACETAMINOPHEN 975 MG: 325 TABLET, FILM COATED ORAL at 15:40

## 2025-01-03 RX ADMIN — DOCUSATE SODIUM 100 MG: 100 CAPSULE, LIQUID FILLED ORAL at 08:14

## 2025-01-03 RX ADMIN — NIFEDIPINE 60 MG: 30 TABLET, FILM COATED, EXTENDED RELEASE ORAL at 20:41

## 2025-01-03 RX ADMIN — ACETAMINOPHEN 975 MG: 325 TABLET, FILM COATED ORAL at 02:37

## 2025-01-03 RX ADMIN — Medication 1 SPRAY: at 02:32

## 2025-01-03 RX ADMIN — SODIUM CHLORIDE, POTASSIUM CHLORIDE, SODIUM LACTATE AND CALCIUM CHLORIDE 500 ML: 600; 310; 30; 20 INJECTION, SOLUTION INTRAVENOUS at 03:58

## 2025-01-03 RX ADMIN — KETOROLAC TROMETHAMINE 30 MG: 30 INJECTION, SOLUTION INTRAMUSCULAR; INTRAVENOUS at 06:55

## 2025-01-03 RX ADMIN — PANTOPRAZOLE SODIUM 40 MG: 40 TABLET, DELAYED RELEASE ORAL at 08:15

## 2025-01-03 RX ADMIN — ACETAMINOPHEN 975 MG: 325 TABLET, FILM COATED ORAL at 21:41

## 2025-01-03 RX ADMIN — DEXTROAMPHETAMINE SACCHARATE, AMPHETAMINE ASPARTATE MONOHYDRATE, DEXTROAMPHETAMINE SULFATE, AND AMPHETAMINE SULFATE 30 MG: 5; 5; 5; 5 CAPSULE ORAL at 08:29

## 2025-01-03 RX ADMIN — NIFEDIPINE 60 MG: 30 TABLET, FILM COATED, EXTENDED RELEASE ORAL at 08:14

## 2025-01-03 ASSESSMENT — ACTIVITIES OF DAILY LIVING (ADL)
ADLS_ACUITY_SCORE: 27

## 2025-01-03 NOTE — PLAN OF CARE
Report received from Lucy STEVENS RN.  Assuming care at this time.        Dr Tellez paged with return call received.  Update included but not limited to:  Patients last blood pressure was 125/79 and RN is aware that 1600 dose of hydralazine and nifedipine er were not given.  Does provider want either or both given now?      Plan per provider give hydralazine now.  Do not give nifedipine as it is long acting.  Plan for RN to remove cooks catheter and check patients cervix then update provider to develop further plan of care.  If patient requires  section, no need to wait until daytime hours to schedule.        Electronic page to Dr Tellez as follows:  212 @ [facility] Vianey MELQUIADES Hydralazine not gived as /51.  JenB.        RN to patient bedside to reposition patient.  RN gently tugged on cooks catheter.  Catheter remained in place.  Patient states she is now aware that there is something in her vagina.  She states it feels like a misplaced tampon.  RN explained cooks balloon and how it mechanically dilates cervix and that her feeling this may be a good thing that hopefully it is doing its job to open her cervix.  Patient states she is so 'over it' and doesn't know how she will 'do this for 2 more hours'.             Rosalinda at bedside discussed risks/benefits/alternatives.  Primary  section consent signed.  Patient to await OR and provider availability, as patient and fetus are stable at this time.  If change in condition RN to notify provider for change to plan of care.      0105  MDA at bedside, discussed anesthesia plan for  section.      0320  Electronic page to Dr Tellez as follows:  212 @ [facility] Vianey ARNETT lots of nasal congestion.  waiting for c/s.  saline nasal spray not helping.  Can we try Afrin nasal spray?  [facility] [phone]    0336  Dr Tellez returned call Updated on patients nasal congestion and order received for flonase and zyrtec.  Dr Tellez available as soon as OR team is for patients primary  section.      3415  External monitors removed.  Patient to OB OR 1 for primary  section.

## 2025-01-03 NOTE — PLAN OF CARE
Goal Outcome Evaluation:      Plan of Care Reviewed With: patient, spouse    Overall Patient Progress: improvingOverall Patient Progress: improving    Vital signs stable. Postpartum assessment WDL, fundus firm at U with scant flow. Incision open to air; no drainage noted. Pain controlled with scheduled tylenol and toradol. Patient on bedrest until magnesium is discontinued; martínez to remain intact until magnesium is discontinued. Breastfeeding attempts every 2-3 hour; bottling with 10 ml of similac; pumping after each feeding attempt. Patient and infant bonding well. Will continue with current plan of care.

## 2025-01-03 NOTE — PROGRESS NOTES
Data: Vianey Schmidt transferred to [facility] via cart at 0720. Baby transferred via parent's arms.  Action: Receiving unit notified of transfer: Yes. Patient and family notified of room change. Report given to Danelle URENA at time of transfer. Belongings sent to receiving unit. Accompanied by Registered Nurse. Oriented patient to surroundings. Call light within reach. ID bands double-checked with receiving RN.  Response: Patient tolerated transfer and is stable.

## 2025-01-03 NOTE — PROVIDER NOTIFICATION
MD Notification    Notified Person: MD    Notified Person Name: Dr. Carlson    Notification Date/Time: 1/3/25 @ 1315    Notification Interaction: Page/Phone    Purpose of Notification: Mckeon Removal    Orders Received: Mckeon to remain intact until magnesium is discontinued.     Comments:

## 2025-01-03 NOTE — PROGRESS NOTES
"S:  Vianey is doing okay, she is frustrated that she is not dilated yet. The magnesium is making her feel poorly. She would like her cook catheter out and would like a .    O:   /53   Temp 98.4  F (36.9  C) (Temporal)   Resp 18   Ht 1.549 m (5' 1\")   Wt 91.2 kg (201 lb)   LMP 2024   SpO2 90%   BMI 37.98 kg/m     Cook catheter removed and cervix still fingertip.    A/P: Vianey Schmidt is a 33yo  at 35w6d who is undergoing IOL due to CHTN with superimposed preeclampsia with severe features     IOL  - s/p cytotec PO x 24hr, and cervidil over night  - s/p cook catheter and pitocin essentially with no change in her cervix. She requests a  due to failed induction. Reviewed risks/benefits of surgery including risk of infection, bleeding, damage to surrounding organs including but not limited to bladder, bowel, uterus, baby, nerves, blood vessels. All questions answered. Surgery and blood consents reviewed and signed.  initially discussed at 11:30 pm, however I was called to  for urgent delivery then had emergent surgery add on from the emergency department.   - epidural in place  - s/p BMS at 32wks  - GBS negative     CHTN with superimposed preeclampsia with severe features  - procardia 60mg BID and hydralazine 10mg QID ordered  - pt with significant SOB with labetalol, plan to avoid  - on magnesium for seizure ppx, plan to continue for 24hr pp     Hypothyroid, cont home med  ADHD, cont home med     Dispo: to OR for  delivery     MD Rajesh Goel OBCARROLLN  25 11:30PM  "

## 2025-01-03 NOTE — PROGRESS NOTES
Data: Vianey Schmidt transferred from L&D via cart to room 414 at 0720. Baby transferred via parent's arms.  Action: Report received from AYANNA Jerez.  Accompanied by Registered Nurse. Oriented family to surroundings. Call light within reach. ID bands double-checked with transferring RN and parents  Response: Patient tolerated transfer and is stable.

## 2025-01-03 NOTE — OP NOTE
DELIVERY OPERATIVE NOTE    Patient ID:  Vianey Schmidt MRN: [id number]   Age: 34 year old   : 1990                                                        .    Date of Surgery:  25    Pre-Op Diagnoses:   Single IUP at 36w0d  Failed induction of labor  Chronic hypertension, superimposed preeclampsia with severe features  Hypothyroidism  ADHD     Post-Op Diagnoses: s/p primary low  delivery    Indications: Vianey is a 33yo  at 36w0d who was admitted to L&D for induction of labor due to worsening CHTN and superimposed preeclampsia with severe features. She has been on magnesium for seizure prophylaxis and overall blood pressures have been stable. She received PO cytotec, cervidil, then cook catheter and pitocin for induction. She had minimal change from closed to fingertip. After discussion, decided on a primary LTCD due to failed induction of labr.    Procedure Performed:   1). Primary Low Transverse  Delivery    Surgeon: Myesha Tellez MD     Anesthesiologist and Assistants: Anesthesiologist: Angel Coleman MD; Vianey Dee MD  Anesthesia Monitoring Tech: Ligia Damon    Type of Anesthesia: Epidural    Consents were signed and reviewed.     Time out was completed to identify the patient, planned procedure, and any known allergies to drugs or products times two.    PROCEDURE IN DETAIL    After review of the informed consent, the patient was placed in the operating room where previously established Epidural anesthesia was administered and found to be adequate. The patient was then placed in the dorsal supine position with a leftward tilt. A martínez catheter was placed and the abdomen prepped and draped in the normal sterile fashion.    A pfannenstiel skin incision was then made with the scalpel and carried through to the underlying layer of fascia with the bovie.  The fascia was incised in the midline and the incision extended laterally with the  Baker scissors.  The superior aspect of the fascial incision was then grasped with the Kocher clamps, elevated, and the underlying rectus muscles dissected off bluntly.  Attention was then turned to the inferior aspect of this incision, which, in a similar fashion, was grasped, tented up with the Kocher clamps, and the rectus muscle dissected off bluntly.  The rectus muscles were then  in the midline, and the peritoneum identified, tented up, and entered sharply with the Metzenbaum scissors.  The peritoneal incision was then extended superiorly and inferiorly with good visualization of the bladder.       The bladder blade was then inserted and the vesicouterine peritoneum identified, grasped with the pick-ups and entered sharply with the Metzenbaum scissors.  This incision was then extended laterally and the bladder flap created digitally.  The bladder blade was then reinserted and the lower uterine segment incised in a transverse fashion with the scalpel.    The uterine incision was then extended laterally with blunt dissection in a cephalad/caudad manner. The bladder blade was removed and the infant s head delivered atraumatically.  The shoulders then were delivered using gentle traction and fundal pressure. The rest of the body readily followed. The infant's nose and mouth were suctioned with bulb suction.  The cord was clamped and cut.  The infant was handed off to the waiting pediatricians.      Intravenous infusion of PITOCIN 30 units in 500 mL of normal saline was started immediately after delivery of the infant. The placenta was then delivered with gentle uterine massage and cord traction.  The uterus was exteriorized, and cleared of all clots and membranes.  The uterine incision was repaired in double-layer with #0 vicryl in a running, locked fashion.  Uterine tone was poor initially so 1g TXA given, pitocin bolused, and hemabate IM given with improvement in tone.    The posterior cul-de-sac was  cleared of all clots with suction. The uterus was returned to the abdomen.  The gutters were cleared of all clots and debri with suction.  The uterine incision was inspected and noted to have excellent hemostasis. The fascia was reapproximated with #1 vicryl in a running fashion.  The subcutaneous tissue was irrigated copiously with warm sterile.  Excellent hemostasis was ensured with the Bovie. The subcutaneous tissue was closed with 2-0 vicryl. The skin was closed with 4-0 monocryl and dermabond.    The patient tolerated the procedure well.                   Pertinent Intraoperative Findings:   - Normal bilateral tubes and ovaries  Baby Delivery:      Complications: uterine atony  Counts: Sponge, instrument, and needle counts were correct prior to and after closure.   Specimens: none  Estimated Blood Loss 100 mL  Urine Output: 150 mL of clear, yellow urine at the end of the case  Prophylactic Antibiotics: 2g ancef and 500mg azithromycin given pre-incision   DVT/PE Prophylaxis: SCDs were on and functioning during entire case  Patient disposition: stable to PACU with infant  The infant is stable at bedside  Patient is an acceptable candidate for .     Myesha Tellez MD  [facility] CARISA Jurado  25

## 2025-01-04 PROCEDURE — 258N000003 HC RX IP 258 OP 636: Performed by: OBSTETRICS & GYNECOLOGY

## 2025-01-04 PROCEDURE — 250N000011 HC RX IP 250 OP 636: Performed by: STUDENT IN AN ORGANIZED HEALTH CARE EDUCATION/TRAINING PROGRAM

## 2025-01-04 PROCEDURE — 120N000012 HC R&B POSTPARTUM

## 2025-01-04 PROCEDURE — 250N000011 HC RX IP 250 OP 636: Performed by: OBSTETRICS & GYNECOLOGY

## 2025-01-04 PROCEDURE — 250N000013 HC RX MED GY IP 250 OP 250 PS 637: Performed by: OBSTETRICS & GYNECOLOGY

## 2025-01-04 PROCEDURE — 250N000013 HC RX MED GY IP 250 OP 250 PS 637: Performed by: STUDENT IN AN ORGANIZED HEALTH CARE EDUCATION/TRAINING PROGRAM

## 2025-01-04 RX ORDER — FERROUS SULFATE 325(65) MG
325 TABLET ORAL DAILY
Status: DISCONTINUED | OUTPATIENT
Start: 2025-01-04 | End: 2025-01-05 | Stop reason: HOSPADM

## 2025-01-04 RX ORDER — HYDRALAZINE HYDROCHLORIDE 10 MG/1
10 TABLET, FILM COATED ORAL 4 TIMES DAILY
Status: DISCONTINUED | OUTPATIENT
Start: 2025-01-04 | End: 2025-01-05 | Stop reason: HOSPADM

## 2025-01-04 RX ADMIN — ACETAMINOPHEN 975 MG: 325 TABLET, FILM COATED ORAL at 21:52

## 2025-01-04 RX ADMIN — MAGNESIUM SULFATE IN WATER 2 G/HR: 20 INJECTION, SOLUTION INTRAVENOUS at 01:42

## 2025-01-04 RX ADMIN — FERROUS SULFATE TAB 325 MG (65 MG ELEMENTAL FE) 325 MG: 325 (65 FE) TAB at 15:55

## 2025-01-04 RX ADMIN — IBUPROFEN 800 MG: 400 TABLET, FILM COATED ORAL at 18:59

## 2025-01-04 RX ADMIN — DOCUSATE SODIUM 100 MG: 100 CAPSULE, LIQUID FILLED ORAL at 20:44

## 2025-01-04 RX ADMIN — DOCUSATE SODIUM 100 MG: 100 CAPSULE, LIQUID FILLED ORAL at 07:58

## 2025-01-04 RX ADMIN — KETOROLAC TROMETHAMINE 30 MG: 30 INJECTION, SOLUTION INTRAMUSCULAR at 01:43

## 2025-01-04 RX ADMIN — IBUPROFEN 800 MG: 400 TABLET, FILM COATED ORAL at 07:13

## 2025-01-04 RX ADMIN — SENNOSIDES AND DOCUSATE SODIUM 1 TABLET: 50; 8.6 TABLET ORAL at 20:44

## 2025-01-04 RX ADMIN — SODIUM CHLORIDE, POTASSIUM CHLORIDE, SODIUM LACTATE AND CALCIUM CHLORIDE 75 ML/HR: 600; 310; 30; 20 INJECTION, SOLUTION INTRAVENOUS at 00:40

## 2025-01-04 RX ADMIN — PANTOPRAZOLE SODIUM 40 MG: 40 TABLET, DELAYED RELEASE ORAL at 07:58

## 2025-01-04 RX ADMIN — ACETAMINOPHEN 975 MG: 325 TABLET, FILM COATED ORAL at 03:35

## 2025-01-04 RX ADMIN — DOXYLAMINE SUCCINATE 25 MG: 25 TABLET ORAL at 21:53

## 2025-01-04 RX ADMIN — ACETAMINOPHEN 975 MG: 325 TABLET, FILM COATED ORAL at 09:27

## 2025-01-04 RX ADMIN — DEXTROAMPHETAMINE SACCHARATE, AMPHETAMINE ASPARTATE MONOHYDRATE, DEXTROAMPHETAMINE SULFATE, AND AMPHETAMINE SULFATE 30 MG: 5; 5; 5; 5 CAPSULE ORAL at 09:27

## 2025-01-04 RX ADMIN — ACETAMINOPHEN 975 MG: 325 TABLET, FILM COATED ORAL at 15:55

## 2025-01-04 RX ADMIN — LEVOTHYROXINE SODIUM 100 MCG: 100 TABLET ORAL at 07:58

## 2025-01-04 RX ADMIN — IBUPROFEN 800 MG: 400 TABLET, FILM COATED ORAL at 13:07

## 2025-01-04 RX ADMIN — NIFEDIPINE 60 MG: 30 TABLET, FILM COATED, EXTENDED RELEASE ORAL at 07:58

## 2025-01-04 RX ADMIN — HYDRALAZINE HYDROCHLORIDE 10 MG: 10 TABLET ORAL at 16:43

## 2025-01-04 RX ADMIN — HYDRALAZINE HYDROCHLORIDE 10 MG: 10 TABLET ORAL at 21:53

## 2025-01-04 RX ADMIN — NIFEDIPINE 60 MG: 30 TABLET, FILM COATED, EXTENDED RELEASE ORAL at 20:44

## 2025-01-04 RX ADMIN — SENNOSIDES AND DOCUSATE SODIUM 2 TABLET: 50; 8.6 TABLET ORAL at 07:58

## 2025-01-04 ASSESSMENT — ACTIVITIES OF DAILY LIVING (ADL)
ADLS_ACUITY_SCORE: 27
ADLS_ACUITY_SCORE: 27
ADLS_ACUITY_SCORE: 25
ADLS_ACUITY_SCORE: 27
ADLS_ACUITY_SCORE: 25
ADLS_ACUITY_SCORE: 27
ADLS_ACUITY_SCORE: 25
ADLS_ACUITY_SCORE: 27
ADLS_ACUITY_SCORE: 25
ADLS_ACUITY_SCORE: 27
ADLS_ACUITY_SCORE: 25
ADLS_ACUITY_SCORE: 27
ADLS_ACUITY_SCORE: 25
ADLS_ACUITY_SCORE: 27

## 2025-01-04 NOTE — LACTATION NOTE
"Lactation visit with Vianey, FOABHISHEK, and baby Nataliia.    Vianey is Pre-E has been on magnesium since Tuesday, infant born via C/S at 36 weeks. Infant has been sleepy at the breast, so infant has been supplemented with formula via bottle. Vianey is pumping, and shares her concerns with with not having \"any milk\". Reviewed pump settings, how to use Initiate Mode, instructed to pump every 3 hours to mimic a baby's feeding schedule. Provided a pump log that illustrates expected pumped volumes from day of delivery through day 14. We practiced hand expression and able to hand express drops of colostrum.    Vianey has her own nipple shields (they are 24 mm), asked Vianey to call for assistance placing the shield the first time she uses it with infant. Discussed feeding expectations with a LPT infant. Demonstrated paced bottle feeding with infant in a side lying position.    Reviewed breast feeding section in our \"Guide to Postpartum and  Care.\" Highlighting pages that educates to  feeding patterns/behavior: Day 1 infant may be more sleepy (the birthday nap); followed by cluster-feeding (breastfeeding marathon) on second day/night.     Discussed physiology of milk production from colostrum through milk \"coming in\" typically between day 3-5; emphasizing adequate early stimulation to the breast is what causes this change to occur. Vianey has a new breast pump for home use.     Appreciative of visit.    Subha Last RN, IBCLC          "

## 2025-01-04 NOTE — PLAN OF CARE
Goal Outcome Evaluation:      Plan of Care Reviewed With: patient, spouse    Overall Patient Progress: improvingOverall Patient Progress: improving    Vital signs stable; diminished reflexes; negative clonus. Postpartum assessment WDL, fundus firm at U with scant flow. Incision open to air with no drainage noted. Pain controlled with scheduled tylenol and ibuprofen. Patient ambulating and voiding independently. Pumping every 3 hours and bottling 15ml of similac every 3 hours. Parents bonding well with infant. Plan of care ongoing.

## 2025-01-04 NOTE — PROVIDER NOTIFICATION
01/03/25 1853   Provider Notification   Provider Name/Title Aldo   Method of Notification Electronic Page   Request Evaluate-Remote   Notification Reason Medication Request     Dr. Carlson updated:  BP's stable. Magnesium will be turned off at 0430.  Pumping and bottle feeding.  Vianey is asking for Unisom for sleep- order rcvd.

## 2025-01-04 NOTE — PROGRESS NOTES
Post Partum Progress Note -  Section    Subjective:   The patient feels  well .  Ambulating:  yes  Voiding: yes  Passing flatus: yes  Tolerating regular diet: yes  Pain well controlled: yes.    Subjective pain ratin out of 10  Lochia: normal    Infant status: well     Objective:     Vitals:    25 0430 25 0816 25 1248 25 1400   BP: 122/70 136/88 (!) 143/91 (!) 145/87   BP Location: Left arm Right arm Left arm    Patient Position: Semi-Sneed's Semi-Sneed's Semi-Sneed's    Cuff Size: Adult Regular Adult Regular Adult Regular    Pulse: 80 96 98    Resp: 18 18 18    Temp:  98.4  F (36.9  C) 98  F (36.7  C)    TempSrc:  Oral Oral    SpO2:       Weight: 91.1 kg (200 lb 12.8 oz)      Height:           Physical Exam:  General: No acute distress, alert and oriented X 3.   Heart: RRR, No murmurs, rubs or gallop.   Lungs: Clear breath sounds bilaterally.  Adequate effort.   Abdomen:  Soft, firm fundus at umbilicus. Appropriately tender.     Incision   C/D&I, well approximated without drainage.   Breast No erythema.  No evidence of infection   Extremities no calf tenderness; edema: trace   Perineum N/A   Lochia  None/Light     Recent Labs   Lab 25  1156 25  1325 24  1640   WBC  --  10.1 10.6   HGB 9.6* 11.9 11.4*   MCV  --  85 85   PLT  --  286 274   NA  --  133* 139   POTASSIUM  --  2.8* 3.6   CHLORIDE  --  97* 104   CO2  --  22 22   BUN  --  6.7 13.6   CR  --  0.40* 0.37*   ANIONGAP  --  14 13   JOSE  --  7.5* 8.9   GLC  --  109* 86   ALBUMIN  --  3.3* 3.4*   PROTTOTAL  --  6.1* 6.0*   BILITOTAL  --  0.4 0.2   ALKPHOS  --  171* 160*   ALT  --  17 16   AST  --  20 18        Assessment & Plan   Vianey Schmidt is a 34 year old  s/p  primary LTCD at 36w0d on 1/3/25   1.  Post Operative Day 1  2.  Activity:  Encouraged pelvic rest x6 weeks.  3.  Continue post partum care  4. cHTN w/JADIEL w/SF: s/p 24hrs postpartum IV Magnesium therapy (turned off at 4am this morning).  BPs initially well controlled since epidural during induction and during IV Magnesium, however now starting to rise. Patient was on Nifedipine XL 60mg daily and Hydralazine 10 mg QID prior to delivery admission (had increasing BPs during 3rd trimester). The PO Hydralazine was discontinued after her BPs started to drop during induction (after Epidural) and was not restarted postpartum yet. Patient desires aggressive management of postpartum BPs as she desires discharge home as soon as possible (hopefully tomorrow). Discussed restarted PO Hydralazine now and if BPs remain controlled thru tomorrow, can consider discharge at that time. Patient in agreement.   5. Post op Hgb down to 9.6 from 11.9 pre-delivery. Start PO Iron.     Dispo: discharge planning POD#2-4 pending BP control.       Linnette Mendez MD  1/4/2025 2:53 PM

## 2025-01-05 VITALS
BODY MASS INDEX: 36.87 KG/M2 | WEIGHT: 195.3 LBS | OXYGEN SATURATION: 94 % | SYSTOLIC BLOOD PRESSURE: 142 MMHG | HEIGHT: 61 IN | TEMPERATURE: 98.5 F | DIASTOLIC BLOOD PRESSURE: 90 MMHG | HEART RATE: 92 BPM | RESPIRATION RATE: 18 BRPM

## 2025-01-05 PROBLEM — O14.13 SEVERE PRE-ECLAMPSIA IN THIRD TRIMESTER: Status: ACTIVE | Noted: 2025-01-05

## 2025-01-05 PROBLEM — I10 CHRONIC HYPERTENSION: Status: ACTIVE | Noted: 2024-12-10

## 2025-01-05 PROBLEM — Z98.891 S/P PRIMARY LOW TRANSVERSE C-SECTION: Status: ACTIVE | Noted: 2025-01-05

## 2025-01-05 PROCEDURE — 250N000013 HC RX MED GY IP 250 OP 250 PS 637: Performed by: STUDENT IN AN ORGANIZED HEALTH CARE EDUCATION/TRAINING PROGRAM

## 2025-01-05 PROCEDURE — 250N000013 HC RX MED GY IP 250 OP 250 PS 637: Performed by: OBSTETRICS & GYNECOLOGY

## 2025-01-05 RX ORDER — OXYCODONE HYDROCHLORIDE 5 MG/1
5 TABLET ORAL EVERY 4 HOURS PRN
Qty: 12 TABLET | Refills: 0 | Status: SHIPPED | OUTPATIENT
Start: 2025-01-05

## 2025-01-05 RX ADMIN — LEVOTHYROXINE SODIUM 100 MCG: 100 TABLET ORAL at 07:39

## 2025-01-05 RX ADMIN — IBUPROFEN 800 MG: 400 TABLET, FILM COATED ORAL at 00:43

## 2025-01-05 RX ADMIN — FERROUS SULFATE TAB 325 MG (65 MG ELEMENTAL FE) 325 MG: 325 (65 FE) TAB at 09:53

## 2025-01-05 RX ADMIN — ACETAMINOPHEN 975 MG: 325 TABLET, FILM COATED ORAL at 03:06

## 2025-01-05 RX ADMIN — NIFEDIPINE 60 MG: 30 TABLET, FILM COATED, EXTENDED RELEASE ORAL at 07:39

## 2025-01-05 RX ADMIN — IBUPROFEN 800 MG: 400 TABLET, FILM COATED ORAL at 07:15

## 2025-01-05 RX ADMIN — HYDRALAZINE HYDROCHLORIDE 10 MG: 10 TABLET ORAL at 07:16

## 2025-01-05 RX ADMIN — ACETAMINOPHEN 975 MG: 325 TABLET, FILM COATED ORAL at 09:51

## 2025-01-05 ASSESSMENT — ACTIVITIES OF DAILY LIVING (ADL)
ADLS_ACUITY_SCORE: 30
ADLS_ACUITY_SCORE: 25

## 2025-01-05 NOTE — LACTATION NOTE
"Lactation visit with Vianey.    Infant 36 weeks and is now down 7.9%. During our visit, Vianey shares her and dad are sharing the feeding responsibility and states she \"thinks Nataliia is taking 10-12 ml per feeding. Due to infant beginning day 3 of life and being down almost 8%, stated infant needs her volume increased. Vianey has concerns increasing the volume d/t infant having a \"spitty\" episode. Reviewed paced bottle feeding and allowing infant \"burp\" breaks during her feeding.    Encouraged Vianey to definitely move to 15ml with the next feeding and slowly work up from there, suggesting to get upwards to 30ml per feeding within the next 24 hours.    Discussed the importance of tracking feeding times, amounts, and infant's wet/poops. Reviewing LPT babies need extra diligence with feedings to ensure they don't loose too much weight and/or become dehydrated.    Vianey has pumped occassionally her, has been encouraged to pump 8 x in 24 hours since she shared her feeding goals were to provide her breastmilk to infant. Reiterated this education during our visit.    Reviewed our discussion with Primary RN.    Subha Last, RN IBCLC  "

## 2025-01-05 NOTE — PLAN OF CARE
D: VSS, assessments WDL.   I: Pt. received complete discharge paperwork and home medications as filled by discharge pharmacy here.  Pt. was given times of last dose for all discharge medications in writing on discharge medication sheets.  Discharge teaching included home medication, pain management, activity restrictions, postpartum cares, and signs and symptoms of infection.  Patient has home blood pressure cuff, and was given parameters to notify provider.   A: Discharge outcomes on care plan met.  Mother states understanding and comfort with self cares and use of blood pressure cuff.  P: Pt. discharged to home.  Pt. was discharged with baby, and bands were checked at time of discharge.  Pt. was accompanied by , nurse and baby, and left with personal belongings.  Home care referral sent.  Pt. to follow up with OB per MD order.  Pt. had no further questions at the time of discharge and no unmet needs were identified.

## 2025-01-05 NOTE — PLAN OF CARE
Goal Outcome Evaluation:      Plan of Care Reviewed With: patient    Overall Patient Progress: improvingOverall Patient Progress: improving         Vital signs stable. Postpartum assessment WDL. Incision liquid bandage. Pain controlled with schedule Tylenol and Ibuprofen. Patient voiding without difficulty. Patient reports passing gas. Pumping for baby. Patient and infant bonding well. Will continue with current plan of care.

## 2025-01-05 NOTE — PROGRESS NOTES
OB  Section Progress Note    Patient name: Vianey Schmidt  : 1990  Admit date: 2024  MRN: [id number]  Acct: [id number]      Assessment/Plan:  Vianey Schmidt is a 34 year old  who is POD#2 from [facility] (failed mIOL, cHTN w/ SI preE w/ SF).     - cHTN w/ SI preE w/ SF: Pt back on pre-delivery PO anti-HTN regimen of Procardia XL 60mg PO qDay and Hydralazine 10mg PO QID (4x/day). BPs over the last 24hr all normotensive to mild-range, non severe range and max , max DBP 91. Pt asx. Pt s/p 24hr of PP Mag and was advised by Dr. Mendez yesterday okay for discharge home today if stable, which she is. Pt aware of risk of PP readmission if things worsening, desires discharge home.  - Pain controlled  - Acute blood loss anemia: POD#1 Hgb 9.6 from pre-op 11.9. Pt asx. Cont PO iron after discharge  - Blood type: A POS, no need for Rhogam  - VFI; breast feeding  - Cont routine PP care. Anticipate d/c home today per pt preference      Subjective:  The patient did well overnight. There were no acute issues. Her pain is well controlled. She notes appropriate lochia. She is tolerating a regular diet well without nausea or vomiting. She has ambulated. She has passed flatus. She is voiding without difficulty. She denies dizziness, lightheadedness, headache, vision changes, chest pain, shortness of breath, RUQ pain, calf pain, or other complaints on ROS.    Objective:  Vitals:  Temp:  [97.8  F (36.6  C)-98.5  F (36.9  C)] 98.5  F (36.9  C)  Pulse:  [80-98] 92  Resp:  [16-18] 18  BP: (118-145)/(80-91) 142/90    Exam:  General: no acute distress  Cardiovascular: HD stable, pulses intact  Pulmonary: no respiratory difficulty, normal non-labored breathing  Abdomen: soft, appropriatly tender to palpation, non-distended  Uterus: fundus firm at U   Incision: bandage in place  Extremities: BL lower extremities non-tender, no palpable cords  Psych: mood appropriate    Labs:  Hemoglobin   Date Value Ref Range  Status   01/03/2025 9.6 (L) 11.7 - 15.7 g/dL Final         MD Rajesh Hebert OBGYN, PA  1/5/2025, 9:38 AM

## 2025-01-05 NOTE — PLAN OF CARE
Goal Outcome Evaluation:      Plan of Care Reviewed With: patient, spouse    Overall Patient Progress: improvingOverall Patient Progress: improving     Blood pressures stable, assessment WNL. Pain controlled with Tylenol and ibuprofen; states satisfaction with pain management. Up in room independently with no complaints of dizziness, voiding without difficulty.  at bedside and supportive. Bottling baby with Similac. Encouraged to call RN with needs, call light within reach.

## 2025-01-05 NOTE — DISCHARGE SUMMARY
[facility] Discharge Summary    Vianey Schmidt MRN# [id number]   Age: 34 year old YOB: 1990     Date of Admission:  2024  Date of Discharge::  2025  Admitting Physician:  Pranav Chance MD  Discharge Physician:  Pranav Chance MD     [facility] clinic[facility]          Admission Diagnoses:   Indication for care in labor or delivery  Chronic Hypertension with super imposed preeclampsia with severe features          Discharge Diagnosis:     Chronic Hypertension with super imposed preeclampsia with severe features          Procedures:     Procedure(s): Primary low transverse  section       No other significant procedures performed during this admission           Medications Prior to Admission:     Medications Prior to Admission   Medication Sig Dispense Refill Last Dose/Taking    amphetamine-dextroamphetamine (ADDERALL XR) 30 MG 24 hr capsule Take 30 mg by mouth daily   2024    Calcium Polycarbophil (FIBER) 625 MG tablet Take by mouth daily.   2024    doxylamine (UNISOM) 25 MG TABS tablet Take 25 mg by mouth nightly as needed for sleep.   2024    hydrALAZINE (APRESOLINE) 10 MG tablet Take 10 mg by mouth 4 times daily.   2024    levothyroxine (SYNTHROID/LEVOTHROID) 100 MCG tablet Take 1 tablet by mouth daily at 2 pm   2024    NIFEdipine ER OSMOTIC (ADALAT CC) 60 MG 24 hr tablet Take 1 tablet (60 mg) by mouth 2 times daily. 60 tablet 0 2024    omeprazole (PRILOSEC) 20 MG DR capsule Take 20 mg by mouth daily.   2024    Prenatal Vit-Fe Fumarate-FA (PRENATAL MULTIVITAMIN  PLUS IRON) 27-1 MG TABS Take by mouth daily.   2024    [DISCONTINUED] aspirin (ASA) 81 MG chewable tablet Take 81 mg by mouth daily.   2024             Discharge Medications:     Current Discharge Medication List        START taking these medications    Details   oxyCODONE (ROXICODONE) 5 MG tablet Take 1 tablet (5 mg) by mouth every 4 hours as  needed for moderate to severe pain.  Qty: 12 tablet, Refills: 0    Associated Diagnoses: S/P primary low transverse            CONTINUE these medications which have NOT CHANGED    Details   amphetamine-dextroamphetamine (ADDERALL XR) 30 MG 24 hr capsule Take 30 mg by mouth daily      Calcium Polycarbophil (FIBER) 625 MG tablet Take by mouth daily.      doxylamine (UNISOM) 25 MG TABS tablet Take 25 mg by mouth nightly as needed for sleep.      hydrALAZINE (APRESOLINE) 10 MG tablet Take 10 mg by mouth 4 times daily.      levothyroxine (SYNTHROID/LEVOTHROID) 100 MCG tablet Take 1 tablet by mouth daily at 2 pm      NIFEdipine ER OSMOTIC (ADALAT CC) 60 MG 24 hr tablet Take 1 tablet (60 mg) by mouth 2 times daily.  Qty: 60 tablet, Refills: 0    Associated Diagnoses: Chronic hypertension      omeprazole (PRILOSEC) 20 MG DR capsule Take 20 mg by mouth daily.      Prenatal Vit-Fe Fumarate-FA (PRENATAL MULTIVITAMIN  PLUS IRON) 27-1 MG TABS Take by mouth daily.           STOP taking these medications       aspirin (ASA) 81 MG chewable tablet Comments:   Reason for Stopping:                     Consultations:   No consultations were requested during this admission          Brief History of Labor or Admission:   Admitted for mIOL, failed induction and needed            Hospital Course:   The patient's hospital course was unremarkable.  She recovered as anticipated and experienced no post-operative complications.  On discharge, her pain was well controlled. Vaginal bleeding is similar to peak menstrual flow.  Voiding without difficulty.  Ambulating well and tolerating a normal diet.  No fever or significant wound drainage.  Breastfeeding well.  Infant is stable.  No bowel movement yet.  She was discharged on post-partum day #2.    Post-partum hemoglobin:   Hemoglobin   Date Value Ref Range Status   2025 9.6 (L) 11.7 - 15.7 g/dL Final             Discharge Instructions and Follow-Up:     Discharge diet:  Regular   Discharge activity: Lifting restricted to 25 pounds  No driving or operating machinery while on narcotic analgesics  Pelvic rest: abstain from intercourse and do not use tampons for 6 week(s)   Discharge follow-up: Follow up with [facility] in 1 and 6 weeks   Wound care: Drink plenty of fluids  Ice to area for comfort  Keep wound clean and dry           Discharge Disposition:     Discharged to home      Attestation:  I have reviewed today's vital signs, notes, medications, labs and imaging.    Pranav Chance MD

## 2025-01-07 NOTE — PROGRESS NOTES
Pharmacy discrepancy due to incorrect wasting of ephedrine. Phenylephrine 10mg used and 20mg ephedrine used. Incorrectly put 10mg wasted of ephedrine when it should have been 0.

## 2025-01-10 PROCEDURE — 88307 TISSUE EXAM BY PATHOLOGIST: CPT | Mod: 26 | Performed by: PATHOLOGY

## 2025-01-16 NOTE — PROVIDER NOTIFICATION
"   01/02/25 0551   Provider Notification   Provider Name/Title Dr. Guardado   Method of Notification Electronic Page   Request Evaluate-Remote   Notification Reason Status Update     Page reads as \"212 EM- SVE 0.5,0%,-5 Cervidil removed. What would you like the plan of care to be. \"    " Detail Level: Detailed Detail Level: Zone

## (undated) DEVICE — DRSG KERLIX FLUFFS X5

## (undated) DEVICE — SOL WATER IRRIG 1000ML BOTTLE 07139-09

## (undated) DEVICE — SOL NACL 0.9% IRRIG 1000ML BOTTLE 07138-09

## (undated) DEVICE — SU VICRYL 3-0 SH 27" J316H

## (undated) DEVICE — LINEN TOWEL PACK X5 5464

## (undated) DEVICE — PAD CHUX UNDERPAD 23X24" 7136

## (undated) DEVICE — BLADE CLIPPER 4406

## (undated) DEVICE — PREP CHLORAPREP 26ML TINTED HI-LITE ORANGE 930815

## (undated) DEVICE — PACK C-SECTION LF PL15OTA83B

## (undated) DEVICE — PACK SET-UP STD 9102

## (undated) DEVICE — SUCTION CANISTER MEDIVAC LINER 3000ML W/LID 65651-530

## (undated) DEVICE — LINEN BABY BLANKET 5434

## (undated) DEVICE — STPL SKIN PROXIMATE 35 WIDE PMW35

## (undated) DEVICE — LIGHT HANDLE X2

## (undated) DEVICE — ESU GROUND PAD UNIVERSAL W/O CORD

## (undated) DEVICE — DRAPE SHEET REV FOLD 3/4 9349

## (undated) RX ORDER — MISOPROSTOL 200 UG/1
TABLET ORAL
Status: DISPENSED
Start: 2025-01-03

## (undated) RX ORDER — ONDANSETRON 2 MG/ML
INJECTION INTRAMUSCULAR; INTRAVENOUS
Status: DISPENSED
Start: 2025-01-03

## (undated) RX ORDER — OXYTOCIN/0.9 % SODIUM CHLORIDE 30/500 ML
PLASTIC BAG, INJECTION (ML) INTRAVENOUS
Status: DISPENSED
Start: 2025-01-03

## (undated) RX ORDER — TRANEXAMIC ACID 10 MG/ML
INJECTION, SOLUTION INTRAVENOUS
Status: DISPENSED
Start: 2025-01-03

## (undated) RX ORDER — MORPHINE SULFATE 1 MG/ML
INJECTION, SOLUTION EPIDURAL; INTRATHECAL; INTRAVENOUS
Status: DISPENSED
Start: 2025-01-03